# Patient Record
Sex: MALE | Race: WHITE | NOT HISPANIC OR LATINO | Employment: UNEMPLOYED | URBAN - METROPOLITAN AREA
[De-identification: names, ages, dates, MRNs, and addresses within clinical notes are randomized per-mention and may not be internally consistent; named-entity substitution may affect disease eponyms.]

---

## 2017-07-03 ENCOUNTER — HISTORICAL (OUTPATIENT)
Dept: ADMINISTRATIVE | Facility: HOSPITAL | Age: 22
End: 2017-07-03

## 2019-03-08 LAB
CHOLEST SERPL-MCNC: 136 MG/DL
HDLC SERPL-MCNC: 55 MG/DL (ref 35–60)
LDLC SERPL CALC-MCNC: 70 MG/DL
TRIGL SERPL-MCNC: 56 MG/DL (ref 30–150)

## 2019-05-23 ENCOUNTER — HISTORICAL (OUTPATIENT)
Dept: ADMINISTRATIVE | Facility: HOSPITAL | Age: 24
End: 2019-05-23

## 2019-05-23 LAB
ABS NEUT (OLG): 1.78 X10(3)/MCL (ref 2.1–9.2)
BASOPHILS # BLD AUTO: 0 X10(3)/MCL (ref 0–0.2)
BASOPHILS NFR BLD AUTO: 1 %
EOSINOPHIL # BLD AUTO: 0.6 X10(3)/MCL (ref 0–0.9)
EOSINOPHIL NFR BLD AUTO: 12 %
ERYTHROCYTE [DISTWIDTH] IN BLOOD BY AUTOMATED COUNT: 22.7 % (ref 11.5–17)
HCT VFR BLD AUTO: 43.1 % (ref 42–52)
HGB BLD-MCNC: 13.8 GM/DL (ref 14–18)
LYMPHOCYTES # BLD AUTO: 2.4 X10(3)/MCL (ref 0.6–4.6)
LYMPHOCYTES NFR BLD AUTO: 45 %
MCH RBC QN AUTO: 27.6 PG (ref 27–31)
MCHC RBC AUTO-ENTMCNC: 32 GM/DL (ref 33–36)
MCV RBC AUTO: 86.2 FL (ref 80–94)
MONOCYTES # BLD AUTO: 0.4 X10(3)/MCL (ref 0.1–1.3)
MONOCYTES NFR BLD AUTO: 9 %
NEUTROPHILS # BLD AUTO: 1.78 X10(3)/MCL (ref 2.1–9.2)
NEUTROPHILS NFR BLD AUTO: 34 %
PLATELET # BLD AUTO: 158 X10(3)/MCL (ref 130–400)
PMV BLD AUTO: ABNORMAL FL (ref 7.4–10.4)
RBC # BLD AUTO: 5 X10(6)/MCL (ref 4.7–6.1)
WBC # SPEC AUTO: 5.2 X10(3)/MCL (ref 4.5–11.5)

## 2019-07-21 LAB — C DIFF INTERP: NEGATIVE

## 2019-07-22 ENCOUNTER — HISTORICAL (OUTPATIENT)
Dept: ADMINISTRATIVE | Facility: HOSPITAL | Age: 24
End: 2019-07-22

## 2019-07-22 LAB — VALPROATE SERPL-MCNC: 78 MCG/ML (ref 50–100)

## 2019-07-23 ENCOUNTER — HISTORICAL (OUTPATIENT)
Dept: ADMINISTRATIVE | Facility: HOSPITAL | Age: 24
End: 2019-07-23

## 2019-07-23 LAB
ABS NEUT (OLG): 2.05 X10(3)/MCL (ref 2.1–9.2)
BASOPHILS # BLD AUTO: 0 X10(3)/MCL (ref 0–0.2)
BASOPHILS NFR BLD AUTO: 0 %
EOSINOPHIL # BLD AUTO: 0.2 X10(3)/MCL (ref 0–0.9)
EOSINOPHIL NFR BLD AUTO: 5 %
ERYTHROCYTE [DISTWIDTH] IN BLOOD BY AUTOMATED COUNT: 15 % (ref 11.5–17)
HCT VFR BLD AUTO: 40.4 % (ref 42–52)
HGB BLD-MCNC: 13.1 GM/DL (ref 14–18)
LYMPHOCYTES # BLD AUTO: 1.6 X10(3)/MCL (ref 0.6–4.6)
LYMPHOCYTES NFR BLD AUTO: 37 %
MCH RBC QN AUTO: 29.5 PG (ref 27–31)
MCHC RBC AUTO-ENTMCNC: 32.4 GM/DL (ref 33–36)
MCV RBC AUTO: 91 FL (ref 80–94)
MONOCYTES # BLD AUTO: 0.5 X10(3)/MCL (ref 0.1–1.3)
MONOCYTES NFR BLD AUTO: 11 %
NEUTROPHILS # BLD AUTO: 2.05 X10(3)/MCL (ref 2.1–9.2)
NEUTROPHILS NFR BLD AUTO: 46 %
PLATELET # BLD AUTO: 103 X10(3)/MCL (ref 130–400)
PMV BLD AUTO: 10.3 FL (ref 9.4–12.4)
RBC # BLD AUTO: 4.44 X10(6)/MCL (ref 4.7–6.1)
WBC # SPEC AUTO: 4.4 X10(3)/MCL (ref 4.5–11.5)

## 2019-07-26 LAB — FINAL CULTURE: NORMAL

## 2019-08-16 ENCOUNTER — HISTORICAL (OUTPATIENT)
Dept: LAB | Facility: HOSPITAL | Age: 24
End: 2019-08-16

## 2019-08-21 ENCOUNTER — HISTORICAL (OUTPATIENT)
Dept: ADMINISTRATIVE | Facility: HOSPITAL | Age: 24
End: 2019-08-21

## 2019-10-24 ENCOUNTER — HISTORICAL (OUTPATIENT)
Dept: LAB | Facility: HOSPITAL | Age: 24
End: 2019-10-24

## 2019-11-11 ENCOUNTER — HISTORICAL (OUTPATIENT)
Dept: LAB | Facility: HOSPITAL | Age: 24
End: 2019-11-11

## 2019-11-11 LAB — C DIFF INTERP: ABNORMAL

## 2020-01-08 LAB
BUN SERPL-MCNC: 10 MG/DL (ref 7–18)
CALCIUM SERPL-MCNC: 9 MG/DL (ref 8.5–10.1)
CHLORIDE SERPL-SCNC: 100 MMOL/L (ref 98–107)
CO2 SERPL-SCNC: 28 MMOL/L (ref 21–32)
CREAT SERPL-MCNC: 0.71 MG/DL (ref 0.6–1.3)
GLUCOSE SERPL-MCNC: 77 MG/DL (ref 74–106)
POTASSIUM SERPL-SCNC: 4 MMOL/L (ref 3.5–5.1)
SODIUM SERPL-SCNC: 136 MEQ/L (ref 131–145)

## 2020-07-08 ENCOUNTER — HISTORICAL (OUTPATIENT)
Dept: ADMINISTRATIVE | Facility: HOSPITAL | Age: 25
End: 2020-07-08

## 2020-07-08 LAB
ABS NEUT (OLG): 3.23 X10(3)/MCL (ref 2.1–9.2)
ALBUMIN SERPL-MCNC: 2.3 GM/DL (ref 3.5–5)
ALBUMIN/GLOB SERPL: 0.7 RATIO (ref 1.1–2)
ALP SERPL-CCNC: 70 UNIT/L (ref 40–150)
ALT SERPL-CCNC: 28 UNIT/L (ref 0–55)
AST SERPL-CCNC: 37 UNIT/L (ref 5–34)
BASOPHILS # BLD AUTO: 0 X10(3)/MCL (ref 0–0.2)
BASOPHILS NFR BLD AUTO: 0 %
BILIRUB SERPL-MCNC: 0.2 MG/DL
BILIRUBIN DIRECT+TOT PNL SERPL-MCNC: 0.1 MG/DL (ref 0–0.5)
BILIRUBIN DIRECT+TOT PNL SERPL-MCNC: 0.1 MG/DL (ref 0–0.8)
BUN SERPL-MCNC: 11.6 MG/DL (ref 8.9–20.6)
CALCIUM SERPL-MCNC: 8.3 MG/DL (ref 8.4–10.2)
CHLORIDE SERPL-SCNC: 105 MMOL/L (ref 98–107)
CO2 SERPL-SCNC: 28 MMOL/L (ref 22–29)
CREAT SERPL-MCNC: 0.71 MG/DL (ref 0.73–1.18)
EOSINOPHIL # BLD AUTO: 0.4 X10(3)/MCL (ref 0–0.9)
EOSINOPHIL NFR BLD AUTO: 8 %
ERYTHROCYTE [DISTWIDTH] IN BLOOD BY AUTOMATED COUNT: 15.7 % (ref 11.5–17)
GLOBULIN SER-MCNC: 3.1 GM/DL (ref 2.4–3.5)
GLUCOSE SERPL-MCNC: 101 MG/DL (ref 74–100)
HCT VFR BLD AUTO: 39.9 % (ref 42–52)
HGB BLD-MCNC: 13.2 GM/DL (ref 14–18)
IMM GRANULOCYTES # BLD AUTO: 0 10*3/UL
IMM GRANULOCYTES NFR BLD AUTO: 0 %
LYMPHOCYTES # BLD AUTO: 1 X10(3)/MCL (ref 0.6–4.6)
LYMPHOCYTES NFR BLD AUTO: 20 %
MCH RBC QN AUTO: 31.3 PG (ref 27–31)
MCHC RBC AUTO-ENTMCNC: 33.1 GM/DL (ref 33–36)
MCV RBC AUTO: 94.5 FL (ref 80–94)
MONOCYTES # BLD AUTO: 0.5 X10(3)/MCL (ref 0.1–1.3)
MONOCYTES NFR BLD AUTO: 10 %
NEUTROPHILS # BLD AUTO: 3.23 X10(3)/MCL (ref 2.1–9.2)
NEUTROPHILS NFR BLD AUTO: 61 %
PLATELET # BLD AUTO: 89 X10(3)/MCL (ref 130–400)
PMV BLD AUTO: 10.5 FL (ref 9.4–12.4)
POTASSIUM SERPL-SCNC: 3.7 MMOL/L (ref 3.5–5.1)
PROT SERPL-MCNC: 5.4 GM/DL (ref 6.4–8.3)
RBC # BLD AUTO: 4.22 X10(6)/MCL (ref 4.7–6.1)
SODIUM SERPL-SCNC: 141 MMOL/L (ref 136–145)
TSH SERPL-ACNC: 1.98 UIU/ML (ref 0.35–4.94)
VALPROATE SERPL-MCNC: 75.2 UG/ML (ref 50–100)
WBC # SPEC AUTO: 5.3 X10(3)/MCL (ref 4.5–11.5)

## 2020-10-19 ENCOUNTER — HISTORICAL (OUTPATIENT)
Dept: ADMINISTRATIVE | Facility: HOSPITAL | Age: 25
End: 2020-10-19

## 2020-10-19 LAB
ABS NEUT (OLG): 0.93 X10(3)/MCL (ref 2.1–9.2)
ALBUMIN SERPL-MCNC: 2.6 GM/DL (ref 3.5–5)
ALBUMIN/GLOB SERPL: 0.8 RATIO (ref 1.1–2)
ALP SERPL-CCNC: 80 UNIT/L (ref 40–150)
ALT SERPL-CCNC: 41 UNIT/L (ref 0–55)
AST SERPL-CCNC: 55 UNIT/L (ref 5–34)
BASOPHILS NFR BLD MANUAL: 2 % (ref 0–2)
BILIRUB SERPL-MCNC: 0.2 MG/DL
BILIRUBIN DIRECT+TOT PNL SERPL-MCNC: 0.1 MG/DL (ref 0–0.5)
BILIRUBIN DIRECT+TOT PNL SERPL-MCNC: 0.1 MG/DL (ref 0–0.8)
BUN SERPL-MCNC: 13.6 MG/DL (ref 8.9–20.6)
CALCIUM SERPL-MCNC: 8.6 MG/DL (ref 8.4–10.2)
CHLORIDE SERPL-SCNC: 106 MMOL/L (ref 98–107)
CO2 SERPL-SCNC: 30 MMOL/L (ref 22–29)
CREAT SERPL-MCNC: 0.7 MG/DL (ref 0.73–1.18)
EOSINOPHIL NFR BLD MANUAL: 14 % (ref 0–8)
ERYTHROCYTE [DISTWIDTH] IN BLOOD BY AUTOMATED COUNT: 17 % (ref 11.5–17)
GLOBULIN SER-MCNC: 3.1 GM/DL (ref 2.4–3.5)
GLUCOSE SERPL-MCNC: 67 MG/DL (ref 74–100)
HCT VFR BLD AUTO: 41.2 % (ref 42–52)
HGB BLD-MCNC: 13.3 GM/DL (ref 14–18)
LYMPHOCYTES NFR BLD MANUAL: 43 % (ref 13–40)
MCH RBC QN AUTO: 30.9 PG (ref 27–31)
MCHC RBC AUTO-ENTMCNC: 32.3 GM/DL (ref 33–36)
MCV RBC AUTO: 95.6 FL (ref 80–94)
MONOCYTES NFR BLD MANUAL: 6 % (ref 2–11)
NEUTROPHILS NFR BLD MANUAL: 35 % (ref 47–80)
PLATELET # BLD AUTO: 78 X10(3)/MCL (ref 130–400)
PLATELET # BLD EST: ABNORMAL 10*3/UL
PMV BLD AUTO: 11.6 FL (ref 7.4–10.4)
POTASSIUM SERPL-SCNC: 4 MMOL/L (ref 3.5–5.1)
PROT SERPL-MCNC: 5.7 GM/DL (ref 6.4–8.3)
RBC # BLD AUTO: 4.31 X10(6)/MCL (ref 4.7–6.1)
RBC MORPH BLD: NORMAL
SODIUM SERPL-SCNC: 141 MMOL/L (ref 136–145)
WBC # SPEC AUTO: 2.8 X10(3)/MCL (ref 4.5–11.5)

## 2021-04-28 ENCOUNTER — HISTORICAL (OUTPATIENT)
Dept: ADMINISTRATIVE | Facility: HOSPITAL | Age: 26
End: 2021-04-28

## 2021-04-28 LAB
ABS NEUT (OLG): 0.96 X10(3)/MCL (ref 2.1–9.2)
ALBUMIN SERPL-MCNC: 2.7 GM/DL (ref 3.5–5)
ALBUMIN/GLOB SERPL: 0.8 RATIO (ref 1.1–2)
ALP SERPL-CCNC: 75 UNIT/L (ref 40–150)
ALT SERPL-CCNC: 28 UNIT/L (ref 0–55)
ANISOCYTOSIS BLD QL SMEAR: 1
AST SERPL-CCNC: 69 UNIT/L (ref 5–34)
BASOPHILS NFR BLD MANUAL: 1 % (ref 0–2)
BILIRUB SERPL-MCNC: 0.3 MG/DL
BILIRUBIN DIRECT+TOT PNL SERPL-MCNC: 0.1 MG/DL (ref 0–0.5)
BILIRUBIN DIRECT+TOT PNL SERPL-MCNC: 0.2 MG/DL (ref 0–0.8)
BUN SERPL-MCNC: 7.4 MG/DL (ref 8.9–20.6)
CALCIUM SERPL-MCNC: 9 MG/DL (ref 8.4–10.2)
CHLORIDE SERPL-SCNC: 103 MMOL/L (ref 98–107)
CHOLEST SERPL-MCNC: 133 MG/DL
CHOLEST/HDLC SERPL: 3 {RATIO} (ref 0–5)
CO2 SERPL-SCNC: 30 MMOL/L (ref 22–29)
CREAT SERPL-MCNC: 0.7 MG/DL (ref 0.73–1.18)
EOSINOPHIL NFR BLD MANUAL: 14 % (ref 0–8)
ERYTHROCYTE [DISTWIDTH] IN BLOOD BY AUTOMATED COUNT: 16.2 % (ref 11.5–17)
GLOBULIN SER-MCNC: 3.5 GM/DL (ref 2.4–3.5)
GLUCOSE SERPL-MCNC: 75 MG/DL (ref 74–100)
HCT VFR BLD AUTO: 40.3 % (ref 42–52)
HDLC SERPL-MCNC: 49 MG/DL (ref 35–60)
HGB BLD-MCNC: 12.7 GM/DL (ref 14–18)
LDLC SERPL CALC-MCNC: 70 MG/DL (ref 50–140)
LYMPHOCYTES NFR BLD MANUAL: 40 % (ref 13–40)
MCH RBC QN AUTO: 31 PG (ref 27–31)
MCHC RBC AUTO-ENTMCNC: 31.5 GM/DL (ref 33–36)
MCV RBC AUTO: 98.3 FL (ref 80–94)
MONOCYTES NFR BLD MANUAL: 12 % (ref 2–11)
NEUTROPHILS NFR BLD MANUAL: 33 % (ref 47–80)
PLATELET # BLD AUTO: 112 X10(3)/MCL (ref 130–400)
PLATELET # BLD EST: ABNORMAL 10*3/UL
PMV BLD AUTO: 11.3 FL (ref 7.4–10.4)
POLYCHROMASIA BLD QL SMEAR: 1
POTASSIUM SERPL-SCNC: 4 MMOL/L (ref 3.5–5.1)
PROT SERPL-MCNC: 6.2 GM/DL (ref 6.4–8.3)
RBC # BLD AUTO: 4.1 X10(6)/MCL (ref 4.7–6.1)
RBC MORPH BLD: ABNORMAL
SODIUM SERPL-SCNC: 140 MMOL/L (ref 136–145)
TRIGL SERPL-MCNC: 70 MG/DL (ref 34–140)
TSH SERPL-ACNC: 2.91 UIU/ML (ref 0.35–4.94)
VALPROATE SERPL-MCNC: 83.5 UG/ML (ref 50–100)
VLDLC SERPL CALC-MCNC: 14 MG/DL
WBC # SPEC AUTO: 3.3 X10(3)/MCL (ref 4.5–11.5)

## 2021-04-30 LAB — FINAL CULTURE: NORMAL

## 2022-04-09 ENCOUNTER — HISTORICAL (OUTPATIENT)
Dept: ADMINISTRATIVE | Facility: HOSPITAL | Age: 27
End: 2022-04-09
Payer: MEDICAID

## 2022-04-25 DIAGNOSIS — G40.909 SEIZURE DISORDER: ICD-10-CM

## 2022-04-25 DIAGNOSIS — D64.9 ANEMIA, UNSPECIFIED TYPE: Primary | ICD-10-CM

## 2022-04-25 DIAGNOSIS — F84.0 AUTISM: ICD-10-CM

## 2022-04-25 DIAGNOSIS — D69.6 THROMBOCYTOPENIA: ICD-10-CM

## 2022-04-26 DIAGNOSIS — D64.9 ANEMIA, UNSPECIFIED TYPE: Primary | ICD-10-CM

## 2022-04-26 DIAGNOSIS — L98.9 SKIN LESIONS: ICD-10-CM

## 2022-04-26 DIAGNOSIS — Z79.899 ONGOING USE OF POSSIBLY TOXIC MEDICATION: ICD-10-CM

## 2022-04-26 DIAGNOSIS — G40.909 SEIZURE DISORDER: ICD-10-CM

## 2022-04-26 DIAGNOSIS — D69.6 THROMBOCYTOPENIA: ICD-10-CM

## 2022-04-26 DIAGNOSIS — F84.0 AUTISM: ICD-10-CM

## 2022-04-27 VITALS
DIASTOLIC BLOOD PRESSURE: 70 MMHG | HEIGHT: 68 IN | OXYGEN SATURATION: 97 % | BODY MASS INDEX: 17.43 KG/M2 | WEIGHT: 115 LBS | SYSTOLIC BLOOD PRESSURE: 102 MMHG

## 2022-04-28 ENCOUNTER — HISTORICAL (OUTPATIENT)
Dept: ADMINISTRATIVE | Facility: HOSPITAL | Age: 27
End: 2022-04-28
Payer: MEDICAID

## 2022-04-28 LAB
ALBUMIN SERPL-MCNC: 3.3 G/DL (ref 3.5–5)
ALBUMIN/GLOB SERPL: 0.8 {RATIO} (ref 1.1–2)
ALP SERPL-CCNC: 82 U/L (ref 40–150)
ALT SERPL-CCNC: 30 U/L (ref 0–55)
AST SERPL-CCNC: 59 U/L (ref 5–34)
BILIRUB SERPL-MCNC: 0.4 MG/DL
BILIRUBIN DIRECT+TOT PNL SERPL-MCNC: 0.2 (ref 0–0.5)
BILIRUBIN DIRECT+TOT PNL SERPL-MCNC: 0.2 (ref 0–0.8)
BUN SERPL-MCNC: 10.9 MG/DL (ref 8.9–20.6)
CALCIUM SERPL-MCNC: 9.6 MG/DL (ref 8.7–10.5)
CHLORIDE SERPL-SCNC: 103 MMOL/L (ref 98–107)
CHOLEST SERPL-MCNC: 170 MG/DL
CHOLEST/HDLC SERPL: 2 {RATIO} (ref 0–5)
CO2 SERPL-SCNC: 29 MMOL/L (ref 22–29)
CREAT SERPL-MCNC: 0.75 MG/DL (ref 0.73–1.18)
GLOBULIN SER-MCNC: 4.3 G/DL (ref 2.4–3.5)
GLUCOSE SERPL-MCNC: 68 MG/DL (ref 74–100)
HDLC SERPL-MCNC: 69 MG/DL (ref 35–60)
HEMOLYSIS INTERF INDEX SERPL-ACNC: 54
ICTERIC INTERF INDEX SERPL-ACNC: 0
LDLC SERPL CALC-MCNC: 90 MG/DL (ref 50–140)
LIPEMIC INTERF INDEX SERPL-ACNC: 1
POTASSIUM SERPL-SCNC: 4.5 MMOL/L (ref 3.5–5.1)
PROT SERPL-MCNC: 7.6 G/DL (ref 6.4–8.3)
SODIUM SERPL-SCNC: 140 MMOL/L (ref 136–145)
TRIGL SERPL-MCNC: 56 MG/DL (ref 34–140)
TSH SERPL-ACNC: 2.64 M[IU]/L (ref 0.35–4.94)
VLDLC SERPL CALC-MCNC: 11 MG/DL

## 2022-05-02 ENCOUNTER — OFFICE VISIT (OUTPATIENT)
Dept: INTERNAL MEDICINE | Facility: CLINIC | Age: 27
End: 2022-05-02
Payer: MEDICAID

## 2022-05-02 VITALS
HEART RATE: 103 BPM | RESPIRATION RATE: 18 BRPM | DIASTOLIC BLOOD PRESSURE: 82 MMHG | SYSTOLIC BLOOD PRESSURE: 128 MMHG | TEMPERATURE: 99 F | WEIGHT: 120 LBS | HEIGHT: 69 IN | BODY MASS INDEX: 17.77 KG/M2 | OXYGEN SATURATION: 98 %

## 2022-05-02 DIAGNOSIS — G40.909 SEIZURE DISORDER: Primary | ICD-10-CM

## 2022-05-02 PROBLEM — F79 INTELLECTUAL DISABILITY: Status: ACTIVE | Noted: 2022-05-02

## 2022-05-02 PROBLEM — Q99.9 GENETIC DISORDER: Status: ACTIVE | Noted: 2022-05-02

## 2022-05-02 PROBLEM — L98.9 SKIN LESION: Status: ACTIVE | Noted: 2022-05-02

## 2022-05-02 PROCEDURE — 99213 PR OFFICE/OUTPT VISIT, EST, LEVL III, 20-29 MIN: ICD-10-PCS | Mod: ,,, | Performed by: INTERNAL MEDICINE

## 2022-05-02 PROCEDURE — 1160F PR REVIEW ALL MEDS BY PRESCRIBER/CLIN PHARMACIST DOCUMENTED: ICD-10-PCS | Mod: CPTII,,, | Performed by: INTERNAL MEDICINE

## 2022-05-02 PROCEDURE — 1160F RVW MEDS BY RX/DR IN RCRD: CPT | Mod: CPTII,,, | Performed by: INTERNAL MEDICINE

## 2022-05-02 PROCEDURE — 3074F SYST BP LT 130 MM HG: CPT | Mod: CPTII,,, | Performed by: INTERNAL MEDICINE

## 2022-05-02 PROCEDURE — 3008F BODY MASS INDEX DOCD: CPT | Mod: CPTII,,, | Performed by: INTERNAL MEDICINE

## 2022-05-02 PROCEDURE — 99213 OFFICE O/P EST LOW 20 MIN: CPT | Mod: ,,, | Performed by: INTERNAL MEDICINE

## 2022-05-02 PROCEDURE — 3074F PR MOST RECENT SYSTOLIC BLOOD PRESSURE < 130 MM HG: ICD-10-PCS | Mod: CPTII,,, | Performed by: INTERNAL MEDICINE

## 2022-05-02 PROCEDURE — 1159F MED LIST DOCD IN RCRD: CPT | Mod: CPTII,,, | Performed by: INTERNAL MEDICINE

## 2022-05-02 PROCEDURE — 1159F PR MEDICATION LIST DOCUMENTED IN MEDICAL RECORD: ICD-10-PCS | Mod: CPTII,,, | Performed by: INTERNAL MEDICINE

## 2022-05-02 PROCEDURE — 3079F DIAST BP 80-89 MM HG: CPT | Mod: CPTII,,, | Performed by: INTERNAL MEDICINE

## 2022-05-02 PROCEDURE — 3079F PR MOST RECENT DIASTOLIC BLOOD PRESSURE 80-89 MM HG: ICD-10-PCS | Mod: CPTII,,, | Performed by: INTERNAL MEDICINE

## 2022-05-02 PROCEDURE — 3008F PR BODY MASS INDEX (BMI) DOCUMENTED: ICD-10-PCS | Mod: CPTII,,, | Performed by: INTERNAL MEDICINE

## 2022-05-02 RX ORDER — RISPERIDONE 1 MG/1
1 TABLET ORAL 2 TIMES DAILY
COMMUNITY
Start: 2022-04-05 | End: 2022-06-07 | Stop reason: SDUPTHER

## 2022-05-02 RX ORDER — LAMOTRIGINE 100 MG/1
100 TABLET ORAL EVERY MORNING
COMMUNITY
Start: 2022-04-05 | End: 2022-06-07 | Stop reason: SDUPTHER

## 2022-05-02 RX ORDER — LACOSAMIDE 100 MG/1
TABLET, FILM COATED ORAL
COMMUNITY
Start: 2022-04-05 | End: 2022-06-07 | Stop reason: SDUPTHER

## 2022-05-02 RX ORDER — MUPIROCIN 20 MG/G
OINTMENT TOPICAL 3 TIMES DAILY
Qty: 15 G | Refills: 5 | Status: SHIPPED | OUTPATIENT
Start: 2022-05-02

## 2022-05-02 RX ORDER — DIVALPROEX SODIUM 500 MG/1
TABLET, FILM COATED, EXTENDED RELEASE ORAL
COMMUNITY
Start: 2022-04-05 | End: 2022-06-07 | Stop reason: SDUPTHER

## 2022-05-02 RX ORDER — DIVALPROEX SODIUM 250 MG/1
250 TABLET, FILM COATED, EXTENDED RELEASE ORAL DAILY
COMMUNITY
Start: 2022-04-05 | End: 2022-06-07 | Stop reason: SDUPTHER

## 2022-05-02 NOTE — PROGRESS NOTES
Subjective:       Patient ID: Alex Ng is a 27 y.o. male.    Chief Complaint: Follow-up    HPI in for fu mmp.  Mom says he is doing well.  No new problems.  She is asking for rf bactroban ointment for minor skin infections.  Review of Systems   All other systems reviewed and are negative.        Objective:      Physical Exam  Vitals reviewed.   Constitutional:       Appearance: Normal appearance.   HENT:      Head: Normocephalic.   Eyes:      Pupils: Pupils are equal, round, and reactive to light.   Cardiovascular:      Rate and Rhythm: Normal rate and regular rhythm.      Pulses: Normal pulses.      Heart sounds: Normal heart sounds.   Pulmonary:      Effort: Pulmonary effort is normal.      Breath sounds: Normal breath sounds.   Abdominal:      General: Abdomen is flat.      Palpations: Abdomen is soft.   Musculoskeletal:      Cervical back: Neck supple.   Skin:     General: Skin is warm and dry.   Neurological:      Mental Status: He is alert.         Assessment:       Problem List Items Addressed This Visit    None         Plan:     continue same rx, fu with me 6 mos

## 2022-06-07 RX ORDER — DIVALPROEX SODIUM 500 MG/1
TABLET, FILM COATED, EXTENDED RELEASE ORAL
Qty: 270 TABLET | Refills: 3 | Status: SHIPPED | OUTPATIENT
Start: 2022-06-07 | End: 2022-11-30

## 2022-06-07 RX ORDER — LACOSAMIDE 100 MG/1
TABLET, FILM COATED ORAL
Qty: 180 TABLET | Refills: 3 | Status: SHIPPED | OUTPATIENT
Start: 2022-06-07 | End: 2022-11-30

## 2022-06-07 RX ORDER — RISPERIDONE 1 MG/1
1 TABLET ORAL 2 TIMES DAILY
Qty: 30 TABLET | Refills: 11 | Status: SHIPPED | OUTPATIENT
Start: 2022-06-07 | End: 2022-11-30

## 2022-06-07 RX ORDER — LAMOTRIGINE 100 MG/1
100 TABLET ORAL EVERY MORNING
Qty: 90 TABLET | Refills: 3 | Status: SHIPPED | OUTPATIENT
Start: 2022-06-07 | End: 2022-12-29

## 2022-06-07 RX ORDER — DIVALPROEX SODIUM 250 MG/1
250 TABLET, FILM COATED, EXTENDED RELEASE ORAL DAILY
Qty: 30 TABLET | Refills: 11 | Status: SHIPPED | OUTPATIENT
Start: 2022-06-07 | End: 2022-11-30

## 2022-09-16 ENCOUNTER — HISTORICAL (OUTPATIENT)
Dept: ADMINISTRATIVE | Facility: HOSPITAL | Age: 27
End: 2022-09-16
Payer: MEDICAID

## 2022-09-29 DIAGNOSIS — G40.909 SEIZURE DISORDER: Primary | ICD-10-CM

## 2022-09-29 RX ORDER — LAMOTRIGINE 150 MG/1
TABLET ORAL
Qty: 30 TABLET | Refills: 6 | Status: SHIPPED | OUTPATIENT
Start: 2022-09-29 | End: 2023-04-20

## 2022-11-02 ENCOUNTER — OFFICE VISIT (OUTPATIENT)
Dept: INTERNAL MEDICINE | Facility: CLINIC | Age: 27
End: 2022-11-02
Payer: MEDICAID

## 2022-11-02 VITALS
DIASTOLIC BLOOD PRESSURE: 68 MMHG | RESPIRATION RATE: 18 BRPM | HEART RATE: 113 BPM | HEIGHT: 69 IN | WEIGHT: 120 LBS | BODY MASS INDEX: 17.77 KG/M2 | OXYGEN SATURATION: 96 % | SYSTOLIC BLOOD PRESSURE: 124 MMHG

## 2022-11-02 DIAGNOSIS — G40.909 SEIZURE DISORDER: Primary | ICD-10-CM

## 2022-11-02 DIAGNOSIS — Z00.00 WELLNESS EXAMINATION: ICD-10-CM

## 2022-11-02 DIAGNOSIS — F84.0 AUTISM: ICD-10-CM

## 2022-11-02 DIAGNOSIS — D69.6 THROMBOCYTOPENIA: ICD-10-CM

## 2022-11-02 DIAGNOSIS — D64.9 ANEMIA, UNSPECIFIED TYPE: ICD-10-CM

## 2022-11-02 PROBLEM — K22.2 STRICTURE OF ESOPHAGUS: Status: ACTIVE | Noted: 2019-08-13

## 2022-11-02 PROCEDURE — 90471 FLU VACCINE (QUAD) GREATER THAN OR EQUAL TO 3YO PRESERVATIVE FREE IM: ICD-10-PCS | Mod: ,,, | Performed by: INTERNAL MEDICINE

## 2022-11-02 PROCEDURE — 99213 PR OFFICE/OUTPT VISIT, EST, LEVL III, 20-29 MIN: ICD-10-PCS | Mod: 25,,, | Performed by: INTERNAL MEDICINE

## 2022-11-02 PROCEDURE — 99213 OFFICE O/P EST LOW 20 MIN: CPT | Mod: 25,,, | Performed by: INTERNAL MEDICINE

## 2022-11-02 PROCEDURE — 90686 IIV4 VACC NO PRSV 0.5 ML IM: CPT | Mod: ,,, | Performed by: INTERNAL MEDICINE

## 2022-11-02 PROCEDURE — 90686 FLU VACCINE (QUAD) GREATER THAN OR EQUAL TO 3YO PRESERVATIVE FREE IM: ICD-10-PCS | Mod: ,,, | Performed by: INTERNAL MEDICINE

## 2022-11-02 PROCEDURE — 90471 IMMUNIZATION ADMIN: CPT | Mod: ,,, | Performed by: INTERNAL MEDICINE

## 2022-11-02 NOTE — PROGRESS NOTES
"Subjective:       Patient ID: Alex Ng is a 27 y.o. male.    Chief Complaint: Follow-up      The patient is a 27-year-old man in for follow-up of multiple problems.  According to his mother he is doing well.  No recent seizures.  He is eating well.  No problems whatsoever.  He has developed a few subcu nodules at his elbows and behind his ears.  That all seems to bother him at all.    Follow-up      Review of Systems     Current Outpatient Medications on File Prior to Visit   Medication Sig Dispense Refill    divalproex ER (DEPAKOTE ER) 250 MG 24 hr tablet Take 1 tablet (250 mg total) by mouth once daily. 30 tablet 11    divalproex ER (DEPAKOTE) 500 MG Tb24 TAKE 1 TABLET IN THE MORNING AND 2 TABLETS AT BEDTIME 270 tablet 3    lamoTRIgine (LAMICTAL) 100 MG tablet Take 1 tablet (100 mg total) by mouth every morning. 90 tablet 3    lamoTRIgine (LAMICTAL) 150 MG Tab TAKE ONE TABLET BY MOUTH IN THE EVENING. 30 tablet 6    mupirocin (BACTROBAN) 2 % ointment Apply topically 3 (three) times daily. 15 g 5    risperiDONE (RISPERDAL) 1 MG tablet Take 1 tablet (1 mg total) by mouth 2 (two) times daily. 30 tablet 11    VIMPAT 100 mg Tab TAKE ONE TABLET BY MOUTH TWICE DAILY FOR SEIZURES 180 tablet 3     No current facility-administered medications on file prior to visit.     Objective:      /68 (BP Location: Left arm, Patient Position: Sitting, BP Method: Large (Manual))   Pulse (!) 113   Resp 18   Ht 5' 9" (1.753 m)   Wt 54.4 kg (120 lb)   SpO2 96%   BMI 17.72 kg/m²     Physical Exam  Vitals reviewed.   Constitutional:       Appearance: Normal appearance.   HENT:      Head: Normocephalic and atraumatic.      Mouth/Throat:      Pharynx: Oropharynx is clear.   Eyes:      Pupils: Pupils are equal, round, and reactive to light.   Neck:      Comments: There is a 2 cm slightly irregular soft nodular mass just behind the right ear.  Movable and nontender.  Cardiovascular:      Rate and Rhythm: Normal rate and " regular rhythm.      Pulses: Normal pulses.      Heart sounds: Normal heart sounds.   Pulmonary:      Breath sounds: Normal breath sounds.   Abdominal:      General: Abdomen is flat.      Palpations: Abdomen is soft.   Musculoskeletal:      Cervical back: Neck supple.      Comments: 2 cm soft movable mass just above the right elbow on the posterior aspect.  Soft benign feeling movable.   Skin:     General: Skin is warm and dry.   Neurological:      Mental Status: He is alert.       Assessment:       1. Seizure disorder    2. Anemia, unspecified type    3. Autism    4. Thrombocytopenia    5. Wellness examination        Overall he is stable.  He has had no recent seizures.  Overall he is doing quite well  He does have some benign-looking subcutaneous nodules.  Removing these would be relatively difficult for him given the condition.  Likely benign we will continue to monitor them.  Plan:       Continue same meds TLC etc.  Follow-up 6 months with CBC CMP lipid TSH prior

## 2023-03-14 ENCOUNTER — TELEPHONE (OUTPATIENT)
Dept: INTERNAL MEDICINE | Facility: CLINIC | Age: 28
End: 2023-03-14
Payer: MEDICAID

## 2023-03-22 DIAGNOSIS — G40.909 EPILEPSY UNDETERMINED AS TO FOCAL OR GENERALIZED: Primary | ICD-10-CM

## 2023-03-22 RX ORDER — LAMOTRIGINE 100 MG/1
TABLET ORAL
Qty: 90 TABLET | Refills: 3 | Status: SHIPPED | OUTPATIENT
Start: 2023-03-22 | End: 2023-10-10

## 2023-04-25 ENCOUNTER — TELEPHONE (OUTPATIENT)
Dept: INTERNAL MEDICINE | Facility: CLINIC | Age: 28
End: 2023-04-25
Payer: MEDICAID

## 2023-04-25 NOTE — TELEPHONE ENCOUNTER
----- Message from Grecia Rivera MA sent at 4/25/2023 12:07 PM CDT -----  Regarding: mee reed 5/2/23   Fasting labs

## 2023-04-26 ENCOUNTER — LAB VISIT (OUTPATIENT)
Dept: LAB | Facility: HOSPITAL | Age: 28
End: 2023-04-26
Attending: INTERNAL MEDICINE
Payer: MEDICAID

## 2023-04-26 DIAGNOSIS — D69.6 THROMBOCYTOPENIA: ICD-10-CM

## 2023-04-26 DIAGNOSIS — Z00.00 WELLNESS EXAMINATION: ICD-10-CM

## 2023-04-26 DIAGNOSIS — D64.9 ANEMIA, UNSPECIFIED TYPE: ICD-10-CM

## 2023-04-26 DIAGNOSIS — G40.909 SEIZURE DISORDER: ICD-10-CM

## 2023-04-26 DIAGNOSIS — F84.0 AUTISM: ICD-10-CM

## 2023-04-26 LAB
ALBUMIN SERPL-MCNC: 3 G/DL (ref 3.5–5)
ALBUMIN/GLOB SERPL: 0.9 RATIO (ref 1.1–2)
ALP SERPL-CCNC: 70 UNIT/L (ref 40–150)
ALT SERPL-CCNC: 25 UNIT/L (ref 0–55)
AST SERPL-CCNC: 33 UNIT/L (ref 5–34)
BASOPHILS # BLD AUTO: 0.01 X10(3)/MCL (ref 0–0.2)
BASOPHILS NFR BLD AUTO: 0.3 %
BILIRUBIN DIRECT+TOT PNL SERPL-MCNC: 0.4 MG/DL
BUN SERPL-MCNC: 11.3 MG/DL (ref 8.9–20.6)
CALCIUM SERPL-MCNC: 9.1 MG/DL (ref 8.4–10.2)
CHLORIDE SERPL-SCNC: 104 MMOL/L (ref 98–107)
CHOLEST SERPL-MCNC: 151 MG/DL
CHOLEST/HDLC SERPL: 3 {RATIO} (ref 0–5)
CO2 SERPL-SCNC: 25 MMOL/L (ref 22–29)
CREAT SERPL-MCNC: 0.79 MG/DL (ref 0.73–1.18)
EOSINOPHIL # BLD AUTO: 0.25 X10(3)/MCL (ref 0–0.9)
EOSINOPHIL NFR BLD AUTO: 7.5 %
ERYTHROCYTE [DISTWIDTH] IN BLOOD BY AUTOMATED COUNT: 16.1 % (ref 11.5–17)
GFR SERPLBLD CREATININE-BSD FMLA CKD-EPI: >60 MLS/MIN/1.73/M2
GLOBULIN SER-MCNC: 3.3 GM/DL (ref 2.4–3.5)
GLUCOSE SERPL-MCNC: 77 MG/DL (ref 74–100)
HCT VFR BLD AUTO: 40.6 % (ref 42–52)
HDLC SERPL-MCNC: 59 MG/DL (ref 35–60)
HGB BLD-MCNC: 12.9 G/DL (ref 14–18)
IMM GRANULOCYTES # BLD AUTO: 0.01 X10(3)/MCL (ref 0–0.04)
IMM GRANULOCYTES NFR BLD AUTO: 0.3 %
LDLC SERPL CALC-MCNC: 81 MG/DL (ref 50–140)
LYMPHOCYTES # BLD AUTO: 1.71 X10(3)/MCL (ref 0.6–4.6)
LYMPHOCYTES NFR BLD AUTO: 51 %
MCH RBC QN AUTO: 29 PG (ref 27–31)
MCHC RBC AUTO-ENTMCNC: 31.8 G/DL (ref 33–36)
MCV RBC AUTO: 91.2 FL (ref 80–94)
MONOCYTES # BLD AUTO: 0.3 X10(3)/MCL (ref 0.1–1.3)
MONOCYTES NFR BLD AUTO: 9 %
NEUTROPHILS # BLD AUTO: 1.07 X10(3)/MCL (ref 2.1–9.2)
NEUTROPHILS NFR BLD AUTO: 31.9 %
NRBC BLD AUTO-RTO: 0 %
PLATELET # BLD AUTO: 119 X10(3)/MCL (ref 130–400)
PMV BLD AUTO: 11.1 FL (ref 7.4–10.4)
POTASSIUM SERPL-SCNC: 4.1 MMOL/L (ref 3.5–5.1)
PROT SERPL-MCNC: 6.3 GM/DL (ref 6.4–8.3)
RBC # BLD AUTO: 4.45 X10(6)/MCL (ref 4.7–6.1)
SODIUM SERPL-SCNC: 139 MMOL/L (ref 136–145)
TRIGL SERPL-MCNC: 57 MG/DL (ref 34–140)
TSH SERPL-ACNC: 2.82 UIU/ML (ref 0.35–4.94)
VLDLC SERPL CALC-MCNC: 11 MG/DL
WBC # SPEC AUTO: 3.4 X10(3)/MCL (ref 4.5–11.5)

## 2023-04-26 PROCEDURE — 84443 ASSAY THYROID STIM HORMONE: CPT

## 2023-04-26 PROCEDURE — 85025 COMPLETE CBC W/AUTO DIFF WBC: CPT

## 2023-04-26 PROCEDURE — 80061 LIPID PANEL: CPT

## 2023-04-26 PROCEDURE — 80053 COMPREHEN METABOLIC PANEL: CPT

## 2023-04-26 PROCEDURE — 36415 COLL VENOUS BLD VENIPUNCTURE: CPT

## 2023-05-02 ENCOUNTER — OFFICE VISIT (OUTPATIENT)
Dept: INTERNAL MEDICINE | Facility: CLINIC | Age: 28
End: 2023-05-02
Payer: MEDICAID

## 2023-05-02 VITALS
HEIGHT: 69 IN | OXYGEN SATURATION: 98 % | SYSTOLIC BLOOD PRESSURE: 116 MMHG | BODY MASS INDEX: 17.48 KG/M2 | WEIGHT: 118 LBS | DIASTOLIC BLOOD PRESSURE: 68 MMHG

## 2023-05-02 DIAGNOSIS — D64.9 ANEMIA, UNSPECIFIED TYPE: ICD-10-CM

## 2023-05-02 DIAGNOSIS — Z00.00 WELLNESS EXAMINATION: Primary | ICD-10-CM

## 2023-05-02 DIAGNOSIS — B37.2 YEAST DERMATITIS: ICD-10-CM

## 2023-05-02 DIAGNOSIS — F84.0 AUTISM: ICD-10-CM

## 2023-05-02 DIAGNOSIS — G40.909 SEIZURE DISORDER: ICD-10-CM

## 2023-05-02 DIAGNOSIS — D69.6 THROMBOCYTOPENIA: ICD-10-CM

## 2023-05-02 PROCEDURE — 99395 PREV VISIT EST AGE 18-39: CPT | Mod: ,,, | Performed by: INTERNAL MEDICINE

## 2023-05-02 PROCEDURE — 99395 PR PREVENTIVE VISIT,EST,18-39: ICD-10-PCS | Mod: ,,, | Performed by: INTERNAL MEDICINE

## 2023-05-02 RX ORDER — BROMPHENIRAMINE MALEATE, DEXTROMETHORPHAN HBR, PHENYLEPHRINE HCL, DIPHENHYDRAMINE HCL, PHENYLEPHRINE HCL 0.52G
0.52 KIT ORAL EVERY OTHER DAY
COMMUNITY

## 2023-05-02 RX ORDER — NYSTATIN 100000 [USP'U]/G
POWDER TOPICAL 2 TIMES DAILY
Qty: 30 G | Refills: 11 | Status: SHIPPED | OUTPATIENT
Start: 2023-05-02

## 2023-05-02 RX ORDER — ESOMEPRAZOLE MAGNESIUM 40 MG/1
40 CAPSULE, DELAYED RELEASE ORAL DAILY
COMMUNITY
Start: 2023-03-05 | End: 2024-01-16

## 2023-05-02 RX ORDER — PYRIDOXINE HCL (VITAMIN B6) 100 MG
100 TABLET ORAL DAILY
COMMUNITY

## 2023-05-02 RX ORDER — LACTOBACILLUS ACIDOPHILUS 2B CELL
1 TABLET ORAL DAILY
COMMUNITY

## 2023-05-02 NOTE — PROGRESS NOTES
"Subjective:       Patient ID: Alex Ng is a 28 y.o. male.    Chief Complaint: Annual Exam (Labs 04/26/23 Rash on groin area, has been having for a couple weeks. )      The patient is a 28-year-old man in for follow-up multiple medical problems/wellness.  No new complaints except for groin rash.  His mom thinks it might be a yeast infection.  He does wear diapers.  He does eat well.  No recent seizures.    Review of Systems     Current Outpatient Medications on File Prior to Visit   Medication Sig Dispense Refill    divalproex 500 MG Tb24 TAKE 1 TABLET IN THE MORNING AND 2 TABLETS AT BEDTIME 90 tablet 3    divalproex ER (DEPAKOTE ER) 250 MG 24 hr tablet TAKE ONE TABLET BY MOUTH EVERY DAY 30 tablet 11    esomeprazole (NEXIUM) 40 MG capsule Take 40 mg by mouth once daily.      Lactobacillus acidophilus 2 billion cell Tab Take 1 capsule by mouth once daily.      lamoTRIgine (LAMICTAL) 100 MG tablet TAKE ONE TABLET BY MOUTH EVERY MORNING 90 tablet 3    lamoTRIgine (LAMICTAL) 150 MG Tab TAKE ONE TABLET BY MOUTH IN THE EVENING. 30 tablet 6    mupirocin (BACTROBAN) 2 % ointment Apply topically 3 (three) times daily. 15 g 5    psyllium 0.52 gram capsule Take 0.52 g by mouth every other day.      pyridoxine, vitamin B6, (B-6) 100 MG Tab Take 100 mg by mouth once daily.      risperiDONE (RISPERDAL) 1 MG tablet TAKE ONE TABLET BY MOUTH TWICE DAILY 60 tablet 11    VIMPAT 100 mg Tab TAKE ONE TABLET BY MOUTH TWICE DAILY FOR SEIZURES 60 tablet 3     No current facility-administered medications on file prior to visit.     Objective:      /68   Ht 5' 9" (1.753 m)   Wt 53.5 kg (118 lb)   SpO2 98%   BMI 17.43 kg/m²     Physical Exam  Vitals reviewed.   Constitutional:       Appearance: Normal appearance.   HENT:      Head: Normocephalic and atraumatic.      Mouth/Throat:      Pharynx: Oropharynx is clear.   Eyes:      Pupils: Pupils are equal, round, and reactive to light.   Cardiovascular:      Rate and Rhythm: " Normal rate and regular rhythm.      Pulses: Normal pulses.      Heart sounds: Normal heart sounds.   Pulmonary:      Breath sounds: Normal breath sounds.   Abdominal:      General: Abdomen is flat.      Palpations: Abdomen is soft.   Genitourinary:     Comments: In the intertriginous areas of the groin there is a fiery red rash that is fairly diffuse with numerous small red satellite lesions.  Musculoskeletal:      Cervical back: Neck supple.   Skin:     General: Skin is warm and dry.   Neurological:      Mental Status: He is alert.       Assessment:       1. Wellness    2. Autism.  Severe.      3. Seizure disorder.  Control    4. Pancytopenia.  Better than previous    5. Yeast dermatitis.  Plan:     Continue same meds TLC etc..  Add nystatin powder topically b.i.d. times 2-3 weeks and it can be used daily as a preventative.  Keep area clean and dry as possible.    Follow-up with me 6 months with CBC CMP prior.

## 2023-06-20 DIAGNOSIS — G40.909 SEIZURE DISORDER: ICD-10-CM

## 2023-06-20 DIAGNOSIS — F84.0 AUTISTIC DISORDER: ICD-10-CM

## 2023-06-20 RX ORDER — LACOSAMIDE 100 MG/1
TABLET, FILM COATED ORAL
Qty: 60 TABLET | Refills: 3 | Status: SHIPPED | OUTPATIENT
Start: 2023-06-20 | End: 2023-10-10

## 2023-06-20 RX ORDER — DIVALPROEX SODIUM 500 MG/1
TABLET, FILM COATED, EXTENDED RELEASE ORAL
Qty: 90 TABLET | Refills: 3 | Status: SHIPPED | OUTPATIENT
Start: 2023-06-20 | End: 2023-10-10

## 2023-07-18 DIAGNOSIS — G40.909 SEIZURE DISORDER: ICD-10-CM

## 2023-07-18 DIAGNOSIS — F84.0 AUTISTIC DISORDER: ICD-10-CM

## 2023-07-18 RX ORDER — RISPERIDONE 1 MG/1
TABLET ORAL
Qty: 60 TABLET | Refills: 11 | Status: SHIPPED | OUTPATIENT
Start: 2023-07-18 | End: 2024-01-17

## 2023-07-18 RX ORDER — DIVALPROEX SODIUM 250 MG/1
TABLET, FILM COATED, EXTENDED RELEASE ORAL
Qty: 30 TABLET | Refills: 11 | Status: SHIPPED | OUTPATIENT
Start: 2023-07-18 | End: 2024-01-17

## 2023-10-10 DIAGNOSIS — F84.0 AUTISTIC DISORDER: ICD-10-CM

## 2023-10-10 DIAGNOSIS — G40.909 EPILEPSY UNDETERMINED AS TO FOCAL OR GENERALIZED: ICD-10-CM

## 2023-10-10 DIAGNOSIS — G40.909 SEIZURE DISORDER: ICD-10-CM

## 2023-10-10 RX ORDER — DIVALPROEX SODIUM 500 MG/1
TABLET, FILM COATED, EXTENDED RELEASE ORAL
Qty: 90 TABLET | Refills: 3 | Status: SHIPPED | OUTPATIENT
Start: 2023-10-10 | End: 2024-02-12

## 2023-10-10 RX ORDER — LACOSAMIDE 100 MG/1
TABLET ORAL
Qty: 60 TABLET | Refills: 3 | Status: SHIPPED | OUTPATIENT
Start: 2023-10-10 | End: 2024-02-12

## 2023-10-10 RX ORDER — LAMOTRIGINE 150 MG/1
TABLET ORAL
Qty: 30 TABLET | Refills: 6 | Status: SHIPPED | OUTPATIENT
Start: 2023-10-10

## 2023-10-10 RX ORDER — LAMOTRIGINE 100 MG/1
TABLET ORAL
Qty: 30 TABLET | Refills: 3 | Status: SHIPPED | OUTPATIENT
Start: 2023-10-10 | End: 2024-01-17

## 2023-11-01 ENCOUNTER — TELEPHONE (OUTPATIENT)
Dept: INTERNAL MEDICINE | Facility: CLINIC | Age: 28
End: 2023-11-01
Payer: MEDICAID

## 2023-11-01 NOTE — TELEPHONE ENCOUNTER
----- Message from Nhi Whitman LPN sent at 11/1/2023  7:40 AM CDT -----  Regarding: mee Israel 11/8 @1:20  Fasting labs needed.

## 2023-11-06 ENCOUNTER — LAB VISIT (OUTPATIENT)
Dept: LAB | Facility: HOSPITAL | Age: 28
End: 2023-11-06
Attending: INTERNAL MEDICINE
Payer: MEDICAID

## 2023-11-06 DIAGNOSIS — G40.909 SEIZURE DISORDER: ICD-10-CM

## 2023-11-06 DIAGNOSIS — F84.0 AUTISM: ICD-10-CM

## 2023-11-06 DIAGNOSIS — D69.6 THROMBOCYTOPENIA: ICD-10-CM

## 2023-11-06 DIAGNOSIS — D64.9 ANEMIA, UNSPECIFIED TYPE: ICD-10-CM

## 2023-11-06 LAB
ALBUMIN SERPL-MCNC: 3.1 G/DL (ref 3.5–5)
ALBUMIN/GLOB SERPL: 0.9 RATIO (ref 1.1–2)
ALP SERPL-CCNC: 66 UNIT/L (ref 40–150)
ALT SERPL-CCNC: 22 UNIT/L (ref 0–55)
AST SERPL-CCNC: 31 UNIT/L (ref 5–34)
BASOPHILS # BLD AUTO: 0.02 X10(3)/MCL
BASOPHILS NFR BLD AUTO: 0.5 %
BILIRUB SERPL-MCNC: 0.3 MG/DL
BUN SERPL-MCNC: 12.7 MG/DL (ref 8.9–20.6)
CALCIUM SERPL-MCNC: 9.5 MG/DL (ref 8.4–10.2)
CHLORIDE SERPL-SCNC: 107 MMOL/L (ref 98–107)
CO2 SERPL-SCNC: 27 MMOL/L (ref 22–29)
CREAT SERPL-MCNC: 0.78 MG/DL (ref 0.73–1.18)
EOSINOPHIL # BLD AUTO: 0.29 X10(3)/MCL (ref 0–0.9)
EOSINOPHIL NFR BLD AUTO: 6.8 %
ERYTHROCYTE [DISTWIDTH] IN BLOOD BY AUTOMATED COUNT: 16.7 % (ref 11.5–17)
GFR SERPLBLD CREATININE-BSD FMLA CKD-EPI: >60 MLS/MIN/1.73/M2
GLOBULIN SER-MCNC: 3.6 GM/DL (ref 2.4–3.5)
GLUCOSE SERPL-MCNC: 70 MG/DL (ref 74–100)
HCT VFR BLD AUTO: 43.1 % (ref 42–52)
HGB BLD-MCNC: 13.6 G/DL (ref 14–18)
IMM GRANULOCYTES # BLD AUTO: 0.01 X10(3)/MCL (ref 0–0.04)
IMM GRANULOCYTES NFR BLD AUTO: 0.2 %
LYMPHOCYTES # BLD AUTO: 1.93 X10(3)/MCL (ref 0.6–4.6)
LYMPHOCYTES NFR BLD AUTO: 45 %
MCH RBC QN AUTO: 27.6 PG (ref 27–31)
MCHC RBC AUTO-ENTMCNC: 31.6 G/DL (ref 33–36)
MCV RBC AUTO: 87.4 FL (ref 80–94)
MONOCYTES # BLD AUTO: 0.34 X10(3)/MCL (ref 0.1–1.3)
MONOCYTES NFR BLD AUTO: 7.9 %
NEUTROPHILS # BLD AUTO: 1.7 X10(3)/MCL (ref 2.1–9.2)
NEUTROPHILS NFR BLD AUTO: 39.6 %
NRBC BLD AUTO-RTO: 0 %
PLATELET # BLD AUTO: 120 X10(3)/MCL (ref 130–400)
PMV BLD AUTO: 11.1 FL (ref 7.4–10.4)
POTASSIUM SERPL-SCNC: 4.2 MMOL/L (ref 3.5–5.1)
PROT SERPL-MCNC: 6.7 GM/DL (ref 6.4–8.3)
RBC # BLD AUTO: 4.93 X10(6)/MCL (ref 4.7–6.1)
SODIUM SERPL-SCNC: 142 MMOL/L (ref 136–145)
WBC # SPEC AUTO: 4.29 X10(3)/MCL (ref 4.5–11.5)

## 2023-11-06 PROCEDURE — 85025 COMPLETE CBC W/AUTO DIFF WBC: CPT

## 2023-11-06 PROCEDURE — 36415 COLL VENOUS BLD VENIPUNCTURE: CPT

## 2023-11-06 PROCEDURE — 80053 COMPREHEN METABOLIC PANEL: CPT

## 2023-11-08 ENCOUNTER — OFFICE VISIT (OUTPATIENT)
Dept: INTERNAL MEDICINE | Facility: CLINIC | Age: 28
End: 2023-11-08
Payer: MEDICAID

## 2023-11-08 VITALS
BODY MASS INDEX: 17.77 KG/M2 | SYSTOLIC BLOOD PRESSURE: 117 MMHG | DIASTOLIC BLOOD PRESSURE: 79 MMHG | OXYGEN SATURATION: 96 % | WEIGHT: 120 LBS | RESPIRATION RATE: 18 BRPM | HEART RATE: 112 BPM | HEIGHT: 69 IN

## 2023-11-08 DIAGNOSIS — G40.909 SEIZURE DISORDER: Primary | ICD-10-CM

## 2023-11-08 DIAGNOSIS — Z00.00 WELLNESS EXAMINATION: ICD-10-CM

## 2023-11-08 DIAGNOSIS — D64.9 ANEMIA, UNSPECIFIED TYPE: ICD-10-CM

## 2023-11-08 DIAGNOSIS — D69.6 THROMBOCYTOPENIA: ICD-10-CM

## 2023-11-08 PROCEDURE — 3008F PR BODY MASS INDEX (BMI) DOCUMENTED: ICD-10-PCS | Mod: CPTII,,, | Performed by: INTERNAL MEDICINE

## 2023-11-08 PROCEDURE — 90686 FLU VACCINE (QUAD) GREATER THAN OR EQUAL TO 3YO PRESERVATIVE FREE IM: ICD-10-PCS | Mod: ,,, | Performed by: INTERNAL MEDICINE

## 2023-11-08 PROCEDURE — 99213 PR OFFICE/OUTPT VISIT, EST, LEVL III, 20-29 MIN: ICD-10-PCS | Mod: 25,,, | Performed by: INTERNAL MEDICINE

## 2023-11-08 PROCEDURE — 1159F PR MEDICATION LIST DOCUMENTED IN MEDICAL RECORD: ICD-10-PCS | Mod: CPTII,,, | Performed by: INTERNAL MEDICINE

## 2023-11-08 PROCEDURE — 90471 IMMUNIZATION ADMIN: CPT | Mod: ,,, | Performed by: INTERNAL MEDICINE

## 2023-11-08 PROCEDURE — 90471 FLU VACCINE (QUAD) GREATER THAN OR EQUAL TO 3YO PRESERVATIVE FREE IM: ICD-10-PCS | Mod: ,,, | Performed by: INTERNAL MEDICINE

## 2023-11-08 PROCEDURE — 3074F SYST BP LT 130 MM HG: CPT | Mod: CPTII,,, | Performed by: INTERNAL MEDICINE

## 2023-11-08 PROCEDURE — 3078F PR MOST RECENT DIASTOLIC BLOOD PRESSURE < 80 MM HG: ICD-10-PCS | Mod: CPTII,,, | Performed by: INTERNAL MEDICINE

## 2023-11-08 PROCEDURE — 90686 IIV4 VACC NO PRSV 0.5 ML IM: CPT | Mod: ,,, | Performed by: INTERNAL MEDICINE

## 2023-11-08 PROCEDURE — 3074F PR MOST RECENT SYSTOLIC BLOOD PRESSURE < 130 MM HG: ICD-10-PCS | Mod: CPTII,,, | Performed by: INTERNAL MEDICINE

## 2023-11-08 PROCEDURE — 1159F MED LIST DOCD IN RCRD: CPT | Mod: CPTII,,, | Performed by: INTERNAL MEDICINE

## 2023-11-08 PROCEDURE — 3008F BODY MASS INDEX DOCD: CPT | Mod: CPTII,,, | Performed by: INTERNAL MEDICINE

## 2023-11-08 PROCEDURE — 99213 OFFICE O/P EST LOW 20 MIN: CPT | Mod: 25,,, | Performed by: INTERNAL MEDICINE

## 2023-11-08 PROCEDURE — 3078F DIAST BP <80 MM HG: CPT | Mod: CPTII,,, | Performed by: INTERNAL MEDICINE

## 2023-11-08 NOTE — PROGRESS NOTES
"Subjective:       Patient ID: Alex Ng is a 28 y.o. male.    Chief Complaint: Follow-up      The patient is a 28-year-old man in for follow-up of multiple medical problems.  He was brought in by his mother and a sitter.  He is had no problems since I saw him last.  He is eating well and gained a bit of weight.    Follow-up      Review of Systems     Current Outpatient Medications on File Prior to Visit   Medication Sig Dispense Refill    divalproex 500 MG Tb24 TAKE 1 TABLET IN THE MORNING AND 2 TABLETS AT BEDTIME 90 tablet 3    divalproex ER (DEPAKOTE ER) 250 MG 24 hr tablet TAKE ONE TABLET BY MOUTH EVERY DAY 30 tablet 11    lacosamide (VIMPAT) 100 mg Tab TAKE ONE TABLET BY MOUTH TWICE DAILY FOR SEIZURES 60 tablet 3    Lactobacillus acidophilus 2 billion cell Tab Take 1 capsule by mouth once daily.      lamoTRIgine (LAMICTAL) 100 MG tablet TAKE ONE TABLET BY MOUTH EVERY MORNING 30 tablet 3    lamoTRIgine (LAMICTAL) 150 MG Tab TAKE ONE TABLET BY MOUTH IN THE EVENING. 30 tablet 6    mupirocin (BACTROBAN) 2 % ointment Apply topically 3 (three) times daily. 15 g 5    nystatin (MYCOSTATIN) powder Apply topically 2 (two) times daily. 30 g 11    psyllium 0.52 gram capsule Take 0.52 g by mouth every other day.      pyridoxine, vitamin B6, (B-6) 100 MG Tab Take 100 mg by mouth once daily.      risperiDONE (RISPERDAL) 1 MG tablet TAKE ONE TABLET BY MOUTH TWICE DAILY 60 tablet 11    esomeprazole (NEXIUM) 40 MG capsule Take 40 mg by mouth once daily.       No current facility-administered medications on file prior to visit.     Objective:      /79 (BP Location: Right arm, Patient Position: Sitting, BP Method: Large (Manual))   Pulse (!) 112   Resp 18   Ht 5' 9" (1.753 m)   Wt 54.4 kg (120 lb)   SpO2 96%   BMI 17.72 kg/m²     Physical Exam  Vitals reviewed.   Constitutional:       Appearance: Normal appearance.      Comments: He is aphasic.  He does not follow commands consistently.  He is in no distress " however.   HENT:      Head: Normocephalic and atraumatic.      Mouth/Throat:      Pharynx: Oropharynx is clear.   Eyes:      Pupils: Pupils are equal, round, and reactive to light.   Cardiovascular:      Rate and Rhythm: Normal rate and regular rhythm.      Pulses: Normal pulses.      Heart sounds: Normal heart sounds.   Pulmonary:      Breath sounds: Normal breath sounds.   Abdominal:      General: Abdomen is flat.      Palpations: Abdomen is soft.   Musculoskeletal:      Cervical back: Neck supple.   Skin:     General: Skin is warm and dry.   Neurological:      Mental Status: He is alert.       Lab work reviewed  Assessment:       1. Seizure disorder.  Stable no recurrence     2. Autism.  No change    3. Abnormal CBC.  Slight anemia and mild thrombocytopenia.  All stable.  Maybe related to medications but acceptable values  Plan:     Same meds TLC etc. and follow-up 6 months for annual checkup with CBC CMP lipid TSH prior

## 2024-01-15 DIAGNOSIS — K21.9 GASTROESOPHAGEAL REFLUX DISEASE, UNSPECIFIED WHETHER ESOPHAGITIS PRESENT: Primary | ICD-10-CM

## 2024-01-16 RX ORDER — ESOMEPRAZOLE MAGNESIUM 40 MG/1
CAPSULE, DELAYED RELEASE ORAL
Qty: 30 CAPSULE | Refills: 5 | Status: SHIPPED | OUTPATIENT
Start: 2024-01-16

## 2024-01-17 DIAGNOSIS — F84.0 AUTISTIC DISORDER: ICD-10-CM

## 2024-01-17 DIAGNOSIS — G40.909 SEIZURE DISORDER: ICD-10-CM

## 2024-01-17 DIAGNOSIS — G40.909 EPILEPSY UNDETERMINED AS TO FOCAL OR GENERALIZED: ICD-10-CM

## 2024-01-17 RX ORDER — RISPERIDONE 1 MG/1
TABLET ORAL
Qty: 60 TABLET | Refills: 11 | Status: SHIPPED | OUTPATIENT
Start: 2024-01-17

## 2024-01-17 RX ORDER — DIVALPROEX SODIUM 250 MG/1
TABLET, FILM COATED, EXTENDED RELEASE ORAL
Qty: 30 TABLET | Refills: 11 | Status: SHIPPED | OUTPATIENT
Start: 2024-01-17

## 2024-01-17 RX ORDER — LAMOTRIGINE 100 MG/1
TABLET ORAL
Qty: 30 TABLET | Refills: 3 | Status: SHIPPED | OUTPATIENT
Start: 2024-01-17 | End: 2024-05-14

## 2024-02-12 DIAGNOSIS — F84.0 AUTISTIC DISORDER: ICD-10-CM

## 2024-02-12 DIAGNOSIS — G40.909 SEIZURE DISORDER: ICD-10-CM

## 2024-02-12 RX ORDER — LACOSAMIDE 100 MG/1
TABLET ORAL
Qty: 60 TABLET | Refills: 3 | Status: SHIPPED | OUTPATIENT
Start: 2024-02-12 | End: 2024-06-11

## 2024-02-12 RX ORDER — DIVALPROEX SODIUM 500 MG/1
TABLET, FILM COATED, EXTENDED RELEASE ORAL
Qty: 90 TABLET | Refills: 3 | Status: SHIPPED | OUTPATIENT
Start: 2024-02-12 | End: 2024-06-11

## 2024-04-12 DIAGNOSIS — G40.909 SEIZURE DISORDER: ICD-10-CM

## 2024-04-15 RX ORDER — LAMOTRIGINE 150 MG/1
TABLET ORAL
Qty: 30 TABLET | Refills: 6 | Status: SHIPPED | OUTPATIENT
Start: 2024-04-15

## 2024-05-08 ENCOUNTER — TELEPHONE (OUTPATIENT)
Dept: INTERNAL MEDICINE | Facility: CLINIC | Age: 29
End: 2024-05-08
Payer: MEDICAID

## 2024-05-08 NOTE — TELEPHONE ENCOUNTER
----- Message from Gianluca Beard LPN sent at 5/8/2024  8:34 AM CDT -----  Regarding: mee reed 5/16 @1  Are there any outstanding tasks in patient chart? Needs fasting labs    Is there documentation of outstanding tasks in patient chart? no    Has patient been to the ER, urgent care, or another physician since last visit?    Has patient done any blood work or x-rays since last visit?    5. PLEASE HAVE PATIENT BRING MEDICATION LIST OR BOTTLES TO EVERY OFFICE VISIT

## 2024-05-13 ENCOUNTER — LAB VISIT (OUTPATIENT)
Dept: LAB | Facility: HOSPITAL | Age: 29
End: 2024-05-13
Attending: INTERNAL MEDICINE
Payer: MEDICAID

## 2024-05-13 DIAGNOSIS — D69.6 THROMBOCYTOPENIA: ICD-10-CM

## 2024-05-13 DIAGNOSIS — D64.9 ANEMIA, UNSPECIFIED TYPE: ICD-10-CM

## 2024-05-13 DIAGNOSIS — G40.909 SEIZURE DISORDER: ICD-10-CM

## 2024-05-13 DIAGNOSIS — Z00.00 WELLNESS EXAMINATION: ICD-10-CM

## 2024-05-13 LAB
ALBUMIN SERPL-MCNC: 3 G/DL (ref 3.5–5)
ALBUMIN/GLOB SERPL: 0.8 RATIO (ref 1.1–2)
ALP SERPL-CCNC: 60 UNIT/L (ref 40–150)
ALT SERPL-CCNC: 18 UNIT/L (ref 0–55)
AST SERPL-CCNC: 26 UNIT/L (ref 5–34)
BASOPHILS # BLD AUTO: 0.02 X10(3)/MCL
BASOPHILS NFR BLD AUTO: 0.5 %
BILIRUB SERPL-MCNC: 0.3 MG/DL
BUN SERPL-MCNC: 12.1 MG/DL (ref 8.9–20.6)
CALCIUM SERPL-MCNC: 9 MG/DL (ref 8.4–10.2)
CHLORIDE SERPL-SCNC: 103 MMOL/L (ref 98–107)
CHOLEST SERPL-MCNC: 143 MG/DL
CHOLEST/HDLC SERPL: 2 {RATIO} (ref 0–5)
CO2 SERPL-SCNC: 28 MMOL/L (ref 22–29)
CREAT SERPL-MCNC: 0.75 MG/DL (ref 0.73–1.18)
EOSINOPHIL # BLD AUTO: 0.19 X10(3)/MCL (ref 0–0.9)
EOSINOPHIL NFR BLD AUTO: 5 %
ERYTHROCYTE [DISTWIDTH] IN BLOOD BY AUTOMATED COUNT: 16.8 % (ref 11.5–17)
GFR SERPLBLD CREATININE-BSD FMLA CKD-EPI: >60 ML/MIN/1.73/M2
GLOBULIN SER-MCNC: 3.6 GM/DL (ref 2.4–3.5)
GLUCOSE SERPL-MCNC: 82 MG/DL (ref 74–100)
HCT VFR BLD AUTO: 38.8 % (ref 42–52)
HDLC SERPL-MCNC: 65 MG/DL (ref 35–60)
HGB BLD-MCNC: 13 G/DL (ref 14–18)
IMM GRANULOCYTES # BLD AUTO: 0.02 X10(3)/MCL (ref 0–0.04)
IMM GRANULOCYTES NFR BLD AUTO: 0.5 %
LDLC SERPL CALC-MCNC: 69 MG/DL (ref 50–140)
LYMPHOCYTES # BLD AUTO: 2.08 X10(3)/MCL (ref 0.6–4.6)
LYMPHOCYTES NFR BLD AUTO: 54.6 %
MCH RBC QN AUTO: 28.8 PG (ref 27–31)
MCHC RBC AUTO-ENTMCNC: 33.5 G/DL (ref 33–36)
MCV RBC AUTO: 86 FL (ref 80–94)
MONOCYTES # BLD AUTO: 0.33 X10(3)/MCL (ref 0.1–1.3)
MONOCYTES NFR BLD AUTO: 8.7 %
NEUTROPHILS # BLD AUTO: 1.17 X10(3)/MCL (ref 2.1–9.2)
NEUTROPHILS NFR BLD AUTO: 30.7 %
NRBC BLD AUTO-RTO: 0 %
PLATELET # BLD AUTO: 104 X10(3)/MCL (ref 130–400)
PLATELETS.RETICULATED NFR BLD AUTO: 4.5 % (ref 0.9–11.2)
PMV BLD AUTO: 10.2 FL (ref 7.4–10.4)
POTASSIUM SERPL-SCNC: 4 MMOL/L (ref 3.5–5.1)
PROT SERPL-MCNC: 6.6 GM/DL (ref 6.4–8.3)
RBC # BLD AUTO: 4.51 X10(6)/MCL (ref 4.7–6.1)
SODIUM SERPL-SCNC: 140 MMOL/L (ref 136–145)
TRIGL SERPL-MCNC: 47 MG/DL (ref 34–140)
TSH SERPL-ACNC: 3.23 UIU/ML (ref 0.35–4.94)
VLDLC SERPL CALC-MCNC: 9 MG/DL
WBC # SPEC AUTO: 3.81 X10(3)/MCL (ref 4.5–11.5)

## 2024-05-13 PROCEDURE — 36415 COLL VENOUS BLD VENIPUNCTURE: CPT

## 2024-05-13 PROCEDURE — 80053 COMPREHEN METABOLIC PANEL: CPT

## 2024-05-13 PROCEDURE — 85025 COMPLETE CBC W/AUTO DIFF WBC: CPT

## 2024-05-13 PROCEDURE — 80061 LIPID PANEL: CPT

## 2024-05-13 PROCEDURE — 84443 ASSAY THYROID STIM HORMONE: CPT

## 2024-05-14 DIAGNOSIS — G40.909 EPILEPSY UNDETERMINED AS TO FOCAL OR GENERALIZED: ICD-10-CM

## 2024-05-14 RX ORDER — LAMOTRIGINE 100 MG/1
TABLET ORAL
Qty: 30 TABLET | Refills: 3 | Status: SHIPPED | OUTPATIENT
Start: 2024-05-14

## 2024-05-16 ENCOUNTER — OFFICE VISIT (OUTPATIENT)
Dept: INTERNAL MEDICINE | Facility: CLINIC | Age: 29
End: 2024-05-16
Payer: MEDICAID

## 2024-05-16 VITALS
WEIGHT: 125 LBS | DIASTOLIC BLOOD PRESSURE: 80 MMHG | BODY MASS INDEX: 18.51 KG/M2 | HEART RATE: 115 BPM | SYSTOLIC BLOOD PRESSURE: 124 MMHG | OXYGEN SATURATION: 98 % | HEIGHT: 69 IN | RESPIRATION RATE: 18 BRPM

## 2024-05-16 DIAGNOSIS — Z00.00 WELLNESS EXAMINATION: Primary | ICD-10-CM

## 2024-05-16 DIAGNOSIS — D69.6 THROMBOCYTOPENIA: ICD-10-CM

## 2024-05-16 DIAGNOSIS — R22.9 SUBCUTANEOUS NODULES: ICD-10-CM

## 2024-05-16 DIAGNOSIS — F84.0 AUTISM: ICD-10-CM

## 2024-05-16 DIAGNOSIS — G40.909 SEIZURE DISORDER: ICD-10-CM

## 2024-05-16 DIAGNOSIS — D64.9 ANEMIA, UNSPECIFIED TYPE: ICD-10-CM

## 2024-05-16 PROCEDURE — 99395 PREV VISIT EST AGE 18-39: CPT | Mod: ,,, | Performed by: INTERNAL MEDICINE

## 2024-05-16 NOTE — PROGRESS NOTES
Subjective:      Patient Care Team:  Alex Mccabe MD as PCP - General (Internal Medicine)      Patient ID: Alex Ng is a 29 y.o. male.    Chief Complaint: Annual Exam (Discuss labs /Has glaucoma in R eye )      Patient is a 29-year-old man who suffers from autism and numerous medical problems comes in for wellness/follow-up.  No new problems according to his mother.  He was developed some subcu nodules that have grown.  He was numerous ones on his right arm and behind the right ear along with a few smaller nodules elsewhere.  They are growing.    Also some issues with minor skin rashes.    Also found to have a cataract in the right eye.  Followed by Ophthalmology.  No surgery planned in the immediate future.      Review of Systems   All other systems reviewed and are negative.       Current Outpatient Medications on File Prior to Visit   Medication Sig Dispense Refill    divalproex ER (DEPAKOTE ER) 250 MG 24 hr tablet TAKE ONE TABLET BY MOUTH EVERY DAY 30 tablet 11    divalproex ER (DEPAKOTE ER) 500 MG Tb24 TAKE 1 TABLET IN THE MORNING AND 2 TABLETS AT BEDTIME 90 tablet 3    esomeprazole (NEXIUM) 40 MG capsule TAKE ONE CAPSULE BY MOUTH EVERY DAY IN THE MORNING BEFORE BREAKFAST 30 capsule 5    lacosamide (VIMPAT) 100 mg Tab TAKE ONE TABLET BY MOUTH TWICE DAILY FOR SEIZURES 60 tablet 3    Lactobacillus acidophilus 2 billion cell Tab Take 1 capsule by mouth once daily.      lamoTRIgine (LAMICTAL) 100 MG tablet TAKE ONE TABLET BY MOUTH EVERY MORNING 30 tablet 3    lamoTRIgine (LAMICTAL) 150 MG Tab TAKE ONE TABLET BY MOUTH IN THE EVENING. 30 tablet 6    mupirocin (BACTROBAN) 2 % ointment Apply topically 3 (three) times daily. 15 g 5    nystatin (MYCOSTATIN) powder Apply topically 2 (two) times daily. 30 g 11    psyllium 0.52 gram capsule Take 0.52 g by mouth every other day.      pyridoxine, vitamin B6, (B-6) 100 MG Tab Take 100 mg by mouth once daily.      risperiDONE (RISPERDAL) 1 MG tablet TAKE ONE  "TABLET BY MOUTH TWICE DAILY 60 tablet 11     No current facility-administered medications on file prior to visit.       Patient Reported Health Risk Assessment       Opioid Screening: Patient medication list reviewed, patient is not taking prescription opioids. Patient is not using additional opioids than prescribed. Patient is at low risk of substance abuse based on this opioid use history.       Objective:      /80 (BP Location: Right arm, Patient Position: Sitting, BP Method: Small (Manual))   Pulse (!) 115   Resp 18   Ht 5' 9.02" (1.753 m)   Wt 56.7 kg (125 lb)   SpO2 98%   BMI 18.45 kg/m²     Physical Exam  Vitals reviewed.   Constitutional:       Appearance: Normal appearance.   HENT:      Head: Normocephalic and atraumatic.      Mouth/Throat:      Pharynx: Oropharynx is clear.   Eyes:      Pupils: Pupils are equal, round, and reactive to light.   Cardiovascular:      Rate and Rhythm: Normal rate and regular rhythm.      Pulses: Normal pulses.      Heart sounds: Normal heart sounds.   Pulmonary:      Breath sounds: Normal breath sounds.   Abdominal:      General: Abdomen is flat.      Palpations: Abdomen is soft.   Musculoskeletal:      Cervical back: Neck supple.   Skin:     General: Skin is warm and dry.      Comments: Multiple soft movable subcutaneous nodules, especially near the right elbow and also behind the right ear.   Neurological:      Mental Status: He is alert.       Laboratory studies reviewed.           No data to display                  5/16/2024     1:00 PM 11/8/2023     1:20 PM 5/2/2023     1:00 PM 11/2/2022     1:00 PM 5/2/2022     1:00 PM   Fall Risk Assessment - Outpatient   Mobility Status Wheelchair Bound Ambulatory Wheelchair Bound Ambulatory Ambulatory w/ assistance   Number of falls 0 0 0 0 0   Identified as fall risk True False True False False                Assessment:       1. Wellness     2. Seizure disorder.  Controlled with multiple meds    3. Mildly abnormal CBC " and persistent the low platelet count.  Likely medication related but stable in no treatment needed     4. Autism.      5. Numerous subcutaneous nodules.  Certainly field benign but a growing            Plan:       Continue same meds.  Refer to general surgery for possible excision of the numerous nodules.  Follow-up with me 6 months with CBC CMP lipid TSH prior        Medicare Annual Wellness and Personalized Prevention Plan:   Fall Risk + Home Safety + Hearing Impairment + Depression Screen + Cognitive Impairment Screen + Health Risk Assessment all reviewed.     The patient has no Health Maintenance topics of status Not Due   The patient's Health Maintenance was reviewed and the following appears to be due at this time:   Health Maintenance Due   Topic Date Due    Hepatitis C Screening  Never done    HIV Screening  Never done    TETANUS VACCINE  05/23/2016    COVID-19 Vaccine (3 - 2023-24 season) 09/01/2023         Advance Care Planning   I attest to discussing Advance Care Planning with patient and/or family member.  Education was provided including the importance of the Health Care Power of , Advance Directives, and/or [place] documentation.  The patient expressed understanding to the importance of this information and discussion.  Length of ACP conversation in minutes:          Follow up in about 6 months (around 11/16/2024). In addition to their scheduled follow up, the patient has also been instructed to follow up on as needed basis.

## 2024-06-03 ENCOUNTER — LAB VISIT (OUTPATIENT)
Dept: LAB | Facility: HOSPITAL | Age: 29
End: 2024-06-03
Attending: SURGERY
Payer: MEDICAID

## 2024-06-03 ENCOUNTER — OFFICE VISIT (OUTPATIENT)
Dept: SURGERY | Facility: CLINIC | Age: 29
End: 2024-06-03
Payer: MEDICAID

## 2024-06-03 VITALS — HEIGHT: 69 IN | BODY MASS INDEX: 18.22 KG/M2 | WEIGHT: 123 LBS

## 2024-06-03 DIAGNOSIS — Z01.818 PREOP EXAMINATION: Primary | ICD-10-CM

## 2024-06-03 DIAGNOSIS — Z01.818 PREOP EXAMINATION: ICD-10-CM

## 2024-06-03 DIAGNOSIS — R22.9 SUBCUTANEOUS NODULES: ICD-10-CM

## 2024-06-03 LAB
ALBUMIN SERPL-MCNC: 3.3 G/DL (ref 3.5–5)
ALBUMIN/GLOB SERPL: 1 RATIO (ref 1.1–2)
ALP SERPL-CCNC: 75 UNIT/L (ref 40–150)
ALT SERPL-CCNC: 23 UNIT/L (ref 0–55)
ANION GAP SERPL CALC-SCNC: 7 MEQ/L
AST SERPL-CCNC: 28 UNIT/L (ref 5–34)
BASOPHILS # BLD AUTO: 0 X10(3)/MCL
BASOPHILS NFR BLD AUTO: 0 %
BILIRUB SERPL-MCNC: 0.2 MG/DL
BUN SERPL-MCNC: 12.3 MG/DL (ref 8.9–20.6)
CALCIUM SERPL-MCNC: 9.4 MG/DL (ref 8.4–10.2)
CHLORIDE SERPL-SCNC: 107 MMOL/L (ref 98–107)
CO2 SERPL-SCNC: 27 MMOL/L (ref 22–29)
CREAT SERPL-MCNC: 0.85 MG/DL (ref 0.73–1.18)
CREAT/UREA NIT SERPL: 14
EOSINOPHIL # BLD AUTO: 0.01 X10(3)/MCL (ref 0–0.9)
EOSINOPHIL NFR BLD AUTO: 0.2 %
ERYTHROCYTE [DISTWIDTH] IN BLOOD BY AUTOMATED COUNT: 17.2 % (ref 11.5–17)
GFR SERPLBLD CREATININE-BSD FMLA CKD-EPI: >60 ML/MIN/1.73/M2
GLOBULIN SER-MCNC: 3.4 GM/DL (ref 2.4–3.5)
GLUCOSE SERPL-MCNC: 72 MG/DL (ref 74–100)
HCT VFR BLD AUTO: 42.2 % (ref 42–52)
HGB BLD-MCNC: 13.4 G/DL (ref 14–18)
IMM GRANULOCYTES # BLD AUTO: 0.01 X10(3)/MCL (ref 0–0.04)
IMM GRANULOCYTES NFR BLD AUTO: 0.2 %
LYMPHOCYTES # BLD AUTO: 1.23 X10(3)/MCL (ref 0.6–4.6)
LYMPHOCYTES NFR BLD AUTO: 30.6 %
MCH RBC QN AUTO: 28.1 PG (ref 27–31)
MCHC RBC AUTO-ENTMCNC: 31.8 G/DL (ref 33–36)
MCV RBC AUTO: 88.5 FL (ref 80–94)
MONOCYTES # BLD AUTO: 0.56 X10(3)/MCL (ref 0.1–1.3)
MONOCYTES NFR BLD AUTO: 13.9 %
NEUTROPHILS # BLD AUTO: 2.21 X10(3)/MCL (ref 2.1–9.2)
NEUTROPHILS NFR BLD AUTO: 55.1 %
NRBC BLD AUTO-RTO: 0 %
PLATELET # BLD AUTO: 111 X10(3)/MCL (ref 130–400)
PMV BLD AUTO: 11.3 FL (ref 7.4–10.4)
POTASSIUM SERPL-SCNC: 3.9 MMOL/L (ref 3.5–5.1)
PROT SERPL-MCNC: 6.7 GM/DL (ref 6.4–8.3)
RBC # BLD AUTO: 4.77 X10(6)/MCL (ref 4.7–6.1)
SODIUM SERPL-SCNC: 141 MMOL/L (ref 136–145)
WBC # SPEC AUTO: 4.02 X10(3)/MCL (ref 4.5–11.5)

## 2024-06-03 PROCEDURE — 99203 OFFICE O/P NEW LOW 30 MIN: CPT | Mod: ,,, | Performed by: SURGERY

## 2024-06-03 PROCEDURE — 1160F RVW MEDS BY RX/DR IN RCRD: CPT | Mod: CPTII,,, | Performed by: SURGERY

## 2024-06-03 PROCEDURE — 36415 COLL VENOUS BLD VENIPUNCTURE: CPT

## 2024-06-03 PROCEDURE — 1159F MED LIST DOCD IN RCRD: CPT | Mod: CPTII,,, | Performed by: SURGERY

## 2024-06-03 PROCEDURE — 3008F BODY MASS INDEX DOCD: CPT | Mod: CPTII,,, | Performed by: SURGERY

## 2024-06-03 NOTE — H&P (VIEW-ONLY)
HISTORY & PHYSICAL  General Surgery    Patient Name: Alex Ng  YOB: 1995    Date: 06/03/2024                   PRESENTING HISTORY     Chief Complaint/Reason for Admission: Consult (Multiple cysts)       History of Present Illness:  Mr. Alex Ng is a 29 y.o. male  with special needs referred for evaluation of mass. He states he  reports a mass on right face and right arm that has been present for quite some time. It has continued to increase in size and is now causing some discomfort. Denies trauma. He denies chest pain, shortness of breath, fever or chills.    Review of Systems:  12 point ROS negative except as stated in HPI    PAST HISTORY:     Past Medical History:   Diagnosis Date    Autism     Dysphagia     narrow esophagus    GERD (gastroesophageal reflux disease)     Lipoma     Seizures     Skin rash     Unspecified cataract      Past Surgical History:   Procedure Laterality Date    EYE SURGERY  1994    GASTRECTOMY       No family history on file.  Social History     Socioeconomic History    Marital status: Single   Tobacco Use    Smoking status: Never    Smokeless tobacco: Never   Substance and Sexual Activity    Alcohol use: Never    Drug use: Never    Sexual activity: Never   Social History Narrative    ** Merged History Encounter **          Social Determinants of Health     Financial Resource Strain: High Risk (6/2/2024)    Overall Financial Resource Strain (CARDIA)     Difficulty of Paying Living Expenses: Very hard   Food Insecurity: Patient Declined (6/2/2024)    Hunger Vital Sign     Worried About Running Out of Food in the Last Year: Patient declined     Ran Out of Food in the Last Year: Patient declined   Physical Activity: Unknown (6/2/2024)    Exercise Vital Sign     Days of Exercise per Week: Patient declined     Minutes of Exercise per Session: 20 min   Recent Concern: Physical Activity - Insufficiently Active (5/16/2024)    Exercise Vital Sign     Days of  "Exercise per Week: 2 days     Minutes of Exercise per Session: 20 min   Stress: Patient Declined (6/2/2024)    [employer] [place] [employer] - Occupational Stress Questionnaire     Feeling of Stress : Patient declined   Housing Stability: Unknown (6/2/2024)    Housing Stability Vital Sign     Unable to Pay for Housing in the Last Year: Patient declined     Homeless in the Last Year: Patient unable to answer       MEDICATIONS & ALLERGIES:     Current Outpatient Medications on File Prior to Visit   Medication Sig    divalproex ER (DEPAKOTE ER) 250 MG 24 hr tablet TAKE ONE TABLET BY MOUTH EVERY DAY    divalproex ER (DEPAKOTE ER) 500 MG Tb24 TAKE 1 TABLET IN THE MORNING AND 2 TABLETS AT BEDTIME    esomeprazole (NEXIUM) 40 MG capsule TAKE ONE CAPSULE BY MOUTH EVERY DAY IN THE MORNING BEFORE BREAKFAST    lacosamide (VIMPAT) 100 mg Tab TAKE ONE TABLET BY MOUTH TWICE DAILY FOR SEIZURES    Lactobacillus acidophilus 2 billion cell Tab Take 1 capsule by mouth once daily.    lamoTRIgine (LAMICTAL) 100 MG tablet TAKE ONE TABLET BY MOUTH EVERY MORNING    lamoTRIgine (LAMICTAL) 150 MG Tab TAKE ONE TABLET BY MOUTH IN THE EVENING.    mupirocin (BACTROBAN) 2 % ointment Apply topically 3 (three) times daily.    nystatin (MYCOSTATIN) powder Apply topically 2 (two) times daily.    pyridoxine, vitamin B6, (B-6) 100 MG Tab Take 100 mg by mouth once daily.    risperiDONE (RISPERDAL) 1 MG tablet TAKE ONE TABLET BY MOUTH TWICE DAILY    psyllium 0.52 gram capsule Take 0.52 g by mouth every other day. (Patient not taking: Reported on 6/3/2024)     No current facility-administered medications on file prior to visit.     Review of patient's allergies indicates:   Allergen Reactions    Adhesive     Dexamethasone     Gluten Diarrhea    Sulfa (sulfonamide antibiotics) Rash       OBJECTIVE:   Visit Vitals  Ht 5' 9" (1.753 m)   Wt 55.8 kg (123 lb)   BMI 18.16 kg/m²       Physical Exam:  General:  Well developed, well nourished, no acute " distress  HEENT:  Normocephalic, atraumatic, PERRL, EOMI, clear sclera, ears normal, neck supple, throat clear without erythema or exudates  CVS:  RRR, S1 and S2 normal, no murmurs, rubs, gallops  Resp:  Lungs clear to auscultation, no wheezes, rales, rhonchi, cough  GI:  Abdomen soft, non-tender, non-distended, normoactive bowel sounds, no masses  :  Deferred  MSK:  No muscle atrophy, cyanosis, peripheral edema, full range of motion  Skin:  No rashes, ulcers, erythema. + Right cheek, 2cm, NTTP; RUE, 7cm, NTTP  Neuro:  CNII-XII grossly intact  Psych:  Alert and oriented to person, place, and time    Results:  I have independently reviewed all pertinent lab and radiologic studies relevant to general/bariatric surgery.    VISIT DIAGNOSES:       ICD-10-CM ICD-9-CM   1. Preop examination  Z01.818 V72.84   2. Subcutaneous nodules  R22.9 782.2       ASSESSMENT/PLAN:   Mr. Alex Ng is a 29 y.o. male with soft tissue masses    -  Risks / Benefits of surgery was discussed with the patients mother who was present and wishes to proceed with removal  - Excision of Soft Tissue Masses   - Preoperative workup

## 2024-06-03 NOTE — PROGRESS NOTES
HISTORY & PHYSICAL  General Surgery    Patient Name: Alex Ng  YOB: 1995    Date: 06/03/2024                   PRESENTING HISTORY     Chief Complaint/Reason for Admission: Consult (Multiple cysts)       History of Present Illness:  Mr. Alex Ng is a 29 y.o. male  with special needs referred for evaluation of mass. He states he  reports a mass on right face and right arm that has been present for quite some time. It has continued to increase in size and is now causing some discomfort. Denies trauma. He denies chest pain, shortness of breath, fever or chills.    Review of Systems:  12 point ROS negative except as stated in HPI    PAST HISTORY:     Past Medical History:   Diagnosis Date    Autism     Dysphagia     narrow esophagus    GERD (gastroesophageal reflux disease)     Lipoma     Seizures     Skin rash     Unspecified cataract      Past Surgical History:   Procedure Laterality Date    EYE SURGERY  1994    GASTRECTOMY       No family history on file.  Social History     Socioeconomic History    Marital status: Single   Tobacco Use    Smoking status: Never    Smokeless tobacco: Never   Substance and Sexual Activity    Alcohol use: Never    Drug use: Never    Sexual activity: Never   Social History Narrative    ** Merged History Encounter **          Social Determinants of Health     Financial Resource Strain: High Risk (6/2/2024)    Overall Financial Resource Strain (CARDIA)     Difficulty of Paying Living Expenses: Very hard   Food Insecurity: Patient Declined (6/2/2024)    Hunger Vital Sign     Worried About Running Out of Food in the Last Year: Patient declined     Ran Out of Food in the Last Year: Patient declined   Physical Activity: Unknown (6/2/2024)    Exercise Vital Sign     Days of Exercise per Week: Patient declined     Minutes of Exercise per Session: 20 min   Recent Concern: Physical Activity - Insufficiently Active (5/16/2024)    Exercise Vital Sign     Days of  "Exercise per Week: 2 days     Minutes of Exercise per Session: 20 min   Stress: Patient Declined (6/2/2024)    [employer] - Occupational Stress Questionnaire     Feeling of Stress : Patient declined   Housing Stability: Unknown (6/2/2024)    Housing Stability Vital Sign     Unable to Pay for Housing in the Last Year: Patient declined     Homeless in the Last Year: Patient unable to answer       MEDICATIONS & ALLERGIES:     Current Outpatient Medications on File Prior to Visit   Medication Sig    divalproex ER (DEPAKOTE ER) 250 MG 24 hr tablet TAKE ONE TABLET BY MOUTH EVERY DAY    divalproex ER (DEPAKOTE ER) 500 MG Tb24 TAKE 1 TABLET IN THE MORNING AND 2 TABLETS AT BEDTIME    esomeprazole (NEXIUM) 40 MG capsule TAKE ONE CAPSULE BY MOUTH EVERY DAY IN THE MORNING BEFORE BREAKFAST    lacosamide (VIMPAT) 100 mg Tab TAKE ONE TABLET BY MOUTH TWICE DAILY FOR SEIZURES    Lactobacillus acidophilus 2 billion cell Tab Take 1 capsule by mouth once daily.    lamoTRIgine (LAMICTAL) 100 MG tablet TAKE ONE TABLET BY MOUTH EVERY MORNING    lamoTRIgine (LAMICTAL) 150 MG Tab TAKE ONE TABLET BY MOUTH IN THE EVENING.    mupirocin (BACTROBAN) 2 % ointment Apply topically 3 (three) times daily.    nystatin (MYCOSTATIN) powder Apply topically 2 (two) times daily.    pyridoxine, vitamin B6, (B-6) 100 MG Tab Take 100 mg by mouth once daily.    risperiDONE (RISPERDAL) 1 MG tablet TAKE ONE TABLET BY MOUTH TWICE DAILY    psyllium 0.52 gram capsule Take 0.52 g by mouth every other day. (Patient not taking: Reported on 6/3/2024)     No current facility-administered medications on file prior to visit.     Review of patient's allergies indicates:   Allergen Reactions    Adhesive     Dexamethasone     Gluten Diarrhea    Sulfa (sulfonamide antibiotics) Rash       OBJECTIVE:   Visit Vitals  Ht 5' 9" (1.753 m)   Wt 55.8 kg (123 lb)   BMI 18.16 kg/m²       Physical Exam:  General:  Well developed, well nourished, no acute " distress  HEENT:  Normocephalic, atraumatic, PERRL, EOMI, clear sclera, ears normal, neck supple, throat clear without erythema or exudates  CVS:  RRR, S1 and S2 normal, no murmurs, rubs, gallops  Resp:  Lungs clear to auscultation, no wheezes, rales, rhonchi, cough  GI:  Abdomen soft, non-tender, non-distended, normoactive bowel sounds, no masses  :  Deferred  MSK:  No muscle atrophy, cyanosis, peripheral edema, full range of motion  Skin:  No rashes, ulcers, erythema. + Right cheek, 2cm, NTTP; RUE, 7cm, NTTP  Neuro:  CNII-XII grossly intact  Psych:  Alert and oriented to person, place, and time    Results:  I have independently reviewed all pertinent lab and radiologic studies relevant to general/bariatric surgery.    VISIT DIAGNOSES:       ICD-10-CM ICD-9-CM   1. Preop examination  Z01.818 V72.84   2. Subcutaneous nodules  R22.9 782.2       ASSESSMENT/PLAN:   Mr. Alex Ng is a 29 y.o. male with soft tissue masses    -  Risks / Benefits of surgery was discussed with the patients mother who was present and wishes to proceed with removal  - Excision of Soft Tissue Masses   - Preoperative workup

## 2024-06-05 DIAGNOSIS — R22.9 SUBCUTANEOUS NODULES: Primary | ICD-10-CM

## 2024-06-05 RX ORDER — SODIUM CHLORIDE, SODIUM LACTATE, POTASSIUM CHLORIDE, CALCIUM CHLORIDE 600; 310; 30; 20 MG/100ML; MG/100ML; MG/100ML; MG/100ML
INJECTION, SOLUTION INTRAVENOUS CONTINUOUS
Status: CANCELLED | OUTPATIENT
Start: 2024-06-05

## 2024-06-05 RX ORDER — ENOXAPARIN SODIUM 100 MG/ML
40 INJECTION SUBCUTANEOUS EVERY 24 HOURS
Status: CANCELLED | OUTPATIENT
Start: 2024-06-05

## 2024-06-11 DIAGNOSIS — G40.909 SEIZURE DISORDER: ICD-10-CM

## 2024-06-11 DIAGNOSIS — F84.0 AUTISTIC DISORDER: ICD-10-CM

## 2024-06-11 RX ORDER — LACOSAMIDE 100 MG/1
TABLET ORAL
Qty: 60 TABLET | Refills: 3 | Status: SHIPPED | OUTPATIENT
Start: 2024-06-11

## 2024-06-11 RX ORDER — DIVALPROEX SODIUM 500 MG/1
TABLET, FILM COATED, EXTENDED RELEASE ORAL
Qty: 90 TABLET | Refills: 3 | Status: SHIPPED | OUTPATIENT
Start: 2024-06-11

## 2024-06-16 ENCOUNTER — ANESTHESIA EVENT (OUTPATIENT)
Dept: SURGERY | Facility: HOSPITAL | Age: 29
End: 2024-06-16
Payer: MEDICAID

## 2024-06-16 NOTE — ANESTHESIA PREPROCEDURE EVALUATION
06/16/2024  Alex Ng is a 29 y.o., male.    Alex Ng    Pre-op Diagnosis: Subcutaneous nodules [R22.9]    Procedure(s):  EXCISION, MASS, UPPER EXTREMITY  Right  EXCISION, MASS, FACE Right     Review of patient's allergies indicates:   Allergen Reactions    Adhesive     Dexamethasone     Gluten Diarrhea    Sulfa (sulfonamide antibiotics) Rash       Current Outpatient Medications   Medication Instructions    divalproex ER (DEPAKOTE ER) 250 MG 24 hr tablet TAKE ONE TABLET BY MOUTH EVERY DAY    divalproex ER (DEPAKOTE ER) 500 MG Tb24 24 hr tablet TAKE 1 TABLET IN THE MORNING AND 2 TABLETS AT BEDTIME    esomeprazole (NEXIUM) 40 MG capsule TAKE ONE CAPSULE BY MOUTH EVERY DAY IN THE MORNING BEFORE BREAKFAST    lacosamide (VIMPAT) 100 mg Tab TAKE ONE TABLET BY MOUTH TWICE DAILY FOR SEIZURES    Lactobacillus acidophilus 2 billion cell Tab 1 capsule, Oral, Daily    lamoTRIgine (LAMICTAL) 100 MG tablet TAKE ONE TABLET BY MOUTH EVERY MORNING    lamoTRIgine (LAMICTAL) 150 MG Tab TAKE ONE TABLET BY MOUTH IN THE EVENING.    mupirocin (BACTROBAN) 2 % ointment Topical (Top), 3 times daily    nystatin (MYCOSTATIN) powder Topical (Top), 2 times daily    psyllium 0.52 g, Oral, As needed (PRN)    pyridoxine (vitamin B6) (B-6) 100 mg, Oral, Daily    risperiDONE (RISPERDAL) 1 MG tablet TAKE ONE TABLET BY MOUTH TWICE DAILY       EXCISION, MASS, UPPER EXTREMITY  Right;EXCISION, MASS, FACE*    Past Medical History:   Diagnosis Date    Autism      non verbal     Dysphagia     narrow esophagus    GERD (gastroesophageal reflux disease)     Lipoma     Seizures     Skin rash     Unspecified cataract     Unspecified cataract     right eye   PMH includes Esophageal Strictures with Esophageal Dilatations    Past Surgical History:   Procedure Laterality Date    EYE SURGERY  1994    GASTRECTOMY      PSH includes Esophageal  Dilatations & Dental work under GA    Lab Results   Component Value Date    WBC 4.02 (L) 06/03/2024    HGB 13.4 (L) 06/03/2024    HCT 42.2 06/03/2024    MCV 88.5 06/03/2024     (L) 06/03/2024          BMP  Lab Results   Component Value Date     06/03/2024    K 3.9 06/03/2024     06/03/2024    CO2 27 06/03/2024    BUN 12.3 06/03/2024    CREATININE 0.85 06/03/2024    CALCIUM 9.4 06/03/2024    EGFRNONAA >60 04/28/2022                   Pre-op Assessment    I have reviewed the Patient Summary Reports.    I have reviewed the NPO Status.   I have reviewed the Medications.     Review of Systems  Anesthesia Hx:  No problems with previous Anesthesia             Denies Family Hx of Anesthesia complications.    Denies Personal Hx of Anesthesia complications.                    Social:  Non-Smoker       Cardiovascular:  Exercise tolerance: poor          Denies Angina.   Denies Orthopnea.  Denies PND.    Denies KAUR.    Functional Capacity low / < 4 METS                         Hepatic/GI:     GERD   DYSPHAGIA          Musculoskeletal:  Musculoskeletal Normal                Neurological:  Denies TIA.  Denies CVA.    Seizures                                Psych:  Psychiatric Normal     AUTISM               Physical Exam  General: Well nourished, Alert and Oriented    Airway:  Mallampati: III   Mouth Opening: Normal  TM Distance: Normal  Tongue: Normal  Neck ROM: Normal ROM    Dental:  Intact    Chest/Lungs:  Clear to auscultation    Heart:  Rate: Normal  Rhythm: Regular Rhythm  No pretibial edema  No carotid bruits      Anesthesia Plan  Type of Anesthesia, risks & benefits discussed:    Anesthesia Type: Gen ETT  Intra-op Monitoring Plan: Standard ASA Monitors  Post Op Pain Control Plan: multimodal analgesia  Induction:  IV  Airway Plan: Direct, Post-Induction  Informed Consent: Informed consent signed with the Patient and all parties understand the risks and agree with anesthesia plan.  All questions answered.  Patient consented to blood products? Yes  ASA Score: 3  Day of Surgery Review of History & Physical: H&P Update referred to the surgeon/provider.    Ready For Surgery From Anesthesia Perspective.     .

## 2024-06-17 ENCOUNTER — ANESTHESIA (OUTPATIENT)
Dept: SURGERY | Facility: HOSPITAL | Age: 29
End: 2024-06-17
Payer: MEDICAID

## 2024-06-17 ENCOUNTER — HOSPITAL ENCOUNTER (OUTPATIENT)
Facility: HOSPITAL | Age: 29
Discharge: HOME | End: 2024-06-17
Attending: SURGERY | Admitting: SURGERY
Payer: MEDICAID

## 2024-06-17 DIAGNOSIS — L98.9 SKIN LESION: Primary | ICD-10-CM

## 2024-06-17 DIAGNOSIS — R22.9 SUBCUTANEOUS NODULES: ICD-10-CM

## 2024-06-17 PROCEDURE — 63600175 PHARM REV CODE 636 W HCPCS: Performed by: NURSE ANESTHETIST, CERTIFIED REGISTERED

## 2024-06-17 PROCEDURE — 11443 EXC FACE-MM B9+MARG 2.1-3 CM: CPT | Mod: 51,,, | Performed by: SURGERY

## 2024-06-17 PROCEDURE — 63600175 PHARM REV CODE 636 W HCPCS: Performed by: SURGERY

## 2024-06-17 PROCEDURE — 71000016 HC POSTOP RECOV ADDL HR: Performed by: SURGERY

## 2024-06-17 PROCEDURE — 11444 EXC FACE-MM B9+MARG 3.1-4 CM: CPT | Mod: ,,, | Performed by: SURGERY

## 2024-06-17 PROCEDURE — 25000003 PHARM REV CODE 250: Performed by: NURSE ANESTHETIST, CERTIFIED REGISTERED

## 2024-06-17 PROCEDURE — 36000707: Performed by: SURGERY

## 2024-06-17 PROCEDURE — 25000003 PHARM REV CODE 250: Performed by: ANESTHESIOLOGY

## 2024-06-17 PROCEDURE — 37000009 HC ANESTHESIA EA ADD 15 MINS: Performed by: SURGERY

## 2024-06-17 PROCEDURE — 71000039 HC RECOVERY, EACH ADD'L HOUR: Performed by: SURGERY

## 2024-06-17 PROCEDURE — 25000003 PHARM REV CODE 250: Performed by: SURGERY

## 2024-06-17 PROCEDURE — 36000706: Performed by: SURGERY

## 2024-06-17 PROCEDURE — 71000033 HC RECOVERY, INTIAL HOUR: Performed by: SURGERY

## 2024-06-17 PROCEDURE — 37000008 HC ANESTHESIA 1ST 15 MINUTES: Performed by: SURGERY

## 2024-06-17 PROCEDURE — 71000015 HC POSTOP RECOV 1ST HR: Performed by: SURGERY

## 2024-06-17 RX ORDER — HYDROMORPHONE HYDROCHLORIDE 2 MG/ML
0.2 INJECTION, SOLUTION INTRAMUSCULAR; INTRAVENOUS; SUBCUTANEOUS EVERY 5 MIN PRN
Status: DISCONTINUED | OUTPATIENT
Start: 2024-06-17 | End: 2024-06-17 | Stop reason: HOSPADM

## 2024-06-17 RX ORDER — IPRATROPIUM BROMIDE AND ALBUTEROL SULFATE 2.5; .5 MG/3ML; MG/3ML
3 SOLUTION RESPIRATORY (INHALATION)
Status: DISCONTINUED | OUTPATIENT
Start: 2024-06-17 | End: 2024-06-17 | Stop reason: HOSPADM

## 2024-06-17 RX ORDER — MIDAZOLAM HYDROCHLORIDE 2 MG/2ML
2 INJECTION, SOLUTION INTRAMUSCULAR; INTRAVENOUS
Status: ACTIVE | OUTPATIENT
Start: 2024-06-17 | End: 2024-06-17

## 2024-06-17 RX ORDER — MIDAZOLAM HYDROCHLORIDE 2 MG/ML
10 SYRUP ORAL ONCE
Status: COMPLETED | OUTPATIENT
Start: 2024-06-17 | End: 2024-06-17

## 2024-06-17 RX ORDER — ONDANSETRON HYDROCHLORIDE 2 MG/ML
INJECTION, SOLUTION INTRAMUSCULAR; INTRAVENOUS
Status: DISCONTINUED | OUTPATIENT
Start: 2024-06-17 | End: 2024-06-17

## 2024-06-17 RX ORDER — ACETAMINOPHEN 500 MG
1000 TABLET ORAL ONCE
Status: DISCONTINUED | OUTPATIENT
Start: 2024-06-17 | End: 2024-06-17

## 2024-06-17 RX ORDER — PROPOFOL 10 MG/ML
VIAL (ML) INTRAVENOUS
Status: DISCONTINUED | OUTPATIENT
Start: 2024-06-17 | End: 2024-06-17

## 2024-06-17 RX ORDER — ENOXAPARIN SODIUM 100 MG/ML
40 INJECTION SUBCUTANEOUS EVERY 24 HOURS
Status: DISCONTINUED | OUTPATIENT
Start: 2024-06-17 | End: 2024-06-17

## 2024-06-17 RX ORDER — MEPERIDINE HYDROCHLORIDE 25 MG/ML
50 INJECTION INTRAMUSCULAR; INTRAVENOUS; SUBCUTANEOUS
Status: DISCONTINUED | OUTPATIENT
Start: 2024-06-17 | End: 2024-06-17 | Stop reason: HOSPADM

## 2024-06-17 RX ORDER — SODIUM CITRATE AND CITRIC ACID MONOHYDRATE 334; 500 MG/5ML; MG/5ML
30 SOLUTION ORAL ONCE
Status: DISCONTINUED | OUTPATIENT
Start: 2024-06-17 | End: 2024-06-17

## 2024-06-17 RX ORDER — MEPERIDINE HYDROCHLORIDE 25 MG/ML
12.5 INJECTION INTRAMUSCULAR; INTRAVENOUS; SUBCUTANEOUS EVERY 10 MIN PRN
Status: DISCONTINUED | OUTPATIENT
Start: 2024-06-17 | End: 2024-06-17 | Stop reason: HOSPADM

## 2024-06-17 RX ORDER — BUPIVACAINE HYDROCHLORIDE AND EPINEPHRINE 5; 5 MG/ML; UG/ML
INJECTION, SOLUTION EPIDURAL; INTRACAUDAL; PERINEURAL
Status: DISCONTINUED | OUTPATIENT
Start: 2024-06-17 | End: 2024-06-17 | Stop reason: HOSPADM

## 2024-06-17 RX ORDER — SODIUM CHLORIDE, SODIUM LACTATE, POTASSIUM CHLORIDE, CALCIUM CHLORIDE 600; 310; 30; 20 MG/100ML; MG/100ML; MG/100ML; MG/100ML
INJECTION, SOLUTION INTRAVENOUS CONTINUOUS
Status: DISCONTINUED | OUTPATIENT
Start: 2024-06-17 | End: 2024-06-17 | Stop reason: HOSPADM

## 2024-06-17 RX ORDER — TRAMADOL HYDROCHLORIDE 50 MG/1
50 TABLET ORAL EVERY 4 HOURS PRN
Status: DISCONTINUED | OUTPATIENT
Start: 2024-06-17 | End: 2024-06-17 | Stop reason: HOSPADM

## 2024-06-17 RX ORDER — ONDANSETRON HYDROCHLORIDE 2 MG/ML
4 INJECTION, SOLUTION INTRAVENOUS EVERY 4 HOURS PRN
Status: DISCONTINUED | OUTPATIENT
Start: 2024-06-17 | End: 2024-06-17 | Stop reason: HOSPADM

## 2024-06-17 RX ORDER — ONDANSETRON HYDROCHLORIDE 2 MG/ML
4 INJECTION, SOLUTION INTRAVENOUS DAILY PRN
Status: DISCONTINUED | OUTPATIENT
Start: 2024-06-17 | End: 2024-06-17 | Stop reason: HOSPADM

## 2024-06-17 RX ORDER — LIDOCAINE HYDROCHLORIDE 10 MG/ML
1 INJECTION, SOLUTION EPIDURAL; INFILTRATION; INTRACAUDAL; PERINEURAL ONCE
Status: DISCONTINUED | OUTPATIENT
Start: 2024-06-17 | End: 2024-06-17 | Stop reason: HOSPADM

## 2024-06-17 RX ORDER — BUPIVACAINE HYDROCHLORIDE AND EPINEPHRINE 5; 5 MG/ML; UG/ML
INJECTION, SOLUTION EPIDURAL; INTRACAUDAL; PERINEURAL
Status: DISCONTINUED
Start: 2024-06-17 | End: 2024-06-17 | Stop reason: HOSPADM

## 2024-06-17 RX ORDER — CEFAZOLIN 2 G/1
2 INJECTION, POWDER, FOR SOLUTION INTRAMUSCULAR; INTRAVENOUS ONCE
Status: COMPLETED | OUTPATIENT
Start: 2024-06-17 | End: 2024-06-17

## 2024-06-17 RX ORDER — SODIUM CHLORIDE, SODIUM GLUCONATE, SODIUM ACETATE, POTASSIUM CHLORIDE AND MAGNESIUM CHLORIDE 30; 37; 368; 526; 502 MG/100ML; MG/100ML; MG/100ML; MG/100ML; MG/100ML
INJECTION, SOLUTION INTRAVENOUS CONTINUOUS
Status: DISCONTINUED | OUTPATIENT
Start: 2024-06-17 | End: 2024-06-17 | Stop reason: HOSPADM

## 2024-06-17 RX ORDER — PROCHLORPERAZINE EDISYLATE 5 MG/ML
5 INJECTION INTRAMUSCULAR; INTRAVENOUS EVERY 30 MIN PRN
Status: DISCONTINUED | OUTPATIENT
Start: 2024-06-17 | End: 2024-06-17 | Stop reason: HOSPADM

## 2024-06-17 RX ORDER — ENOXAPARIN SODIUM 100 MG/ML
40 INJECTION SUBCUTANEOUS ONCE
Status: COMPLETED | OUTPATIENT
Start: 2024-06-17 | End: 2024-06-17

## 2024-06-17 RX ORDER — ROCURONIUM BROMIDE 10 MG/ML
INJECTION, SOLUTION INTRAVENOUS
Status: DISCONTINUED | OUTPATIENT
Start: 2024-06-17 | End: 2024-06-17

## 2024-06-17 RX ORDER — TRAMADOL HYDROCHLORIDE 50 MG/1
50 TABLET ORAL EVERY 6 HOURS PRN
Qty: 20 TABLET | Refills: 0 | Status: SHIPPED | OUTPATIENT
Start: 2024-06-17

## 2024-06-17 RX ORDER — LIDOCAINE HYDROCHLORIDE 10 MG/ML
INJECTION, SOLUTION EPIDURAL; INFILTRATION; INTRACAUDAL; PERINEURAL
Status: DISCONTINUED | OUTPATIENT
Start: 2024-06-17 | End: 2024-06-17

## 2024-06-17 RX ORDER — FENTANYL CITRATE 50 UG/ML
INJECTION, SOLUTION INTRAMUSCULAR; INTRAVENOUS
Status: DISCONTINUED | OUTPATIENT
Start: 2024-06-17 | End: 2024-06-17

## 2024-06-17 RX ORDER — PHENYLEPHRINE HYDROCHLORIDE 10 MG/ML
INJECTION INTRAVENOUS
Status: DISCONTINUED | OUTPATIENT
Start: 2024-06-17 | End: 2024-06-17

## 2024-06-17 RX ORDER — HYDROCODONE BITARTRATE AND ACETAMINOPHEN 7.5; 325 MG/1; MG/1
1 TABLET ORAL EVERY 6 HOURS PRN
Status: DISCONTINUED | OUTPATIENT
Start: 2024-06-17 | End: 2024-06-17 | Stop reason: HOSPADM

## 2024-06-17 RX ORDER — DIPHENHYDRAMINE HYDROCHLORIDE 50 MG/ML
25 INJECTION INTRAMUSCULAR; INTRAVENOUS EVERY 6 HOURS PRN
Status: DISCONTINUED | OUTPATIENT
Start: 2024-06-17 | End: 2024-06-17 | Stop reason: HOSPADM

## 2024-06-17 RX ADMIN — LIDOCAINE HYDROCHLORIDE 4 ML: 10 INJECTION, SOLUTION EPIDURAL; INFILTRATION; INTRACAUDAL; PERINEURAL at 12:06

## 2024-06-17 RX ADMIN — ENOXAPARIN SODIUM 40 MG: 40 INJECTION SUBCUTANEOUS at 11:06

## 2024-06-17 RX ADMIN — SODIUM CHLORIDE, POTASSIUM CHLORIDE, SODIUM LACTATE AND CALCIUM CHLORIDE: 600; 310; 30; 20 INJECTION, SOLUTION INTRAVENOUS at 12:06

## 2024-06-17 RX ADMIN — CEFAZOLIN 2 G: 2 INJECTION, POWDER, FOR SOLUTION INTRAMUSCULAR; INTRAVENOUS at 12:06

## 2024-06-17 RX ADMIN — PHENYLEPHRINE HYDROCHLORIDE 100 MCG: 10 INJECTION INTRAVENOUS at 01:06

## 2024-06-17 RX ADMIN — SUGAMMADEX 200 MG: 100 INJECTION, SOLUTION INTRAVENOUS at 01:06

## 2024-06-17 RX ADMIN — PROPOFOL 130 MG: 10 INJECTION, EMULSION INTRAVENOUS at 12:06

## 2024-06-17 RX ADMIN — MIDAZOLAM HYDROCHLORIDE 10 MG: 2 SYRUP ORAL at 12:06

## 2024-06-17 RX ADMIN — ROCURONIUM BROMIDE 40 MG: 50 INJECTION INTRAVENOUS at 12:06

## 2024-06-17 RX ADMIN — ONDANSETRON 4 MG: 2 INJECTION INTRAMUSCULAR; INTRAVENOUS at 01:06

## 2024-06-17 RX ADMIN — FENTANYL CITRATE 50 MCG: 50 INJECTION, SOLUTION INTRAMUSCULAR; INTRAVENOUS at 12:06

## 2024-06-17 NOTE — DISCHARGE INSTRUCTIONS
Post Operative Instructions for Lipoma/Skin Mass Removal       Dr. Fountain's Sutured Wound Care Instructions:    - Keep surgical dressing clean and dry for 48 hours post op, then ok to remove dressing and shower.  - Wash incision daily with antibacterial soap and water. Pat dry. Leave open to air.  - Do not remove steri strips. Steri-strips will fall off within 1-2 weeks.   - Activity as tolerated.   - Regular diet.    Lipoma Removal, Care After  This sheet gives you information about how to care for yourself after your procedure. Your health care provider may also give you more specific instructions. If you have problems or questions, contact your health care provider.  What can I expect after the procedure?  After the procedure, it is common to have:   Mild pain.   Swelling.   Bruising.  Follow these instructions at home:  Bathing     Do not take baths, swim, or use a hot tub for 2 weeks after surgery, ensure incision(s) have completely closed prior to bathing/swimming. Ask your health care provider if you may take showers. You may only be allowed to take sponge baths.  Incision care     Follow instructions from your health care provider about how to take care of your incision. Make sure you:  ? Wash your hands with soap and water for at least 20 seconds before and after you change your dressing. If soap and water are not available, use hand .   Check your incision area every day for signs of infection. Check for:  ? More redness, swelling, or pain.  ? Fluid or blood.  ? Warmth.  ? Pus or a bad smell.  Medicines   Take over-the-counter and prescription medicines only as told by your health care provider.   If you were prescribed an antibiotic medicine, use it as told by your health care provider. Do not stop using the antibiotic even if you start to feel better.  General instructions     If you were given a sedative during the procedure, it can affect you for several hours. Do not drive or operate  machinery until your health care provider says that it is safe.   Do not use any products that contain nicotine or tobacco, such as cigarettes, e-cigarettes, and chewing tobacco. These can delay healing. If you need help quitting, ask your health care provider.   Return to your normal activities as told by your health care provider. Ask your health care provider what activities are safe for you.   Keep all follow-up visits as told by your health care provider. This is important.  Contact a health care provider if:   You have more redness, swelling, or pain around your incision.   You have fluid or blood coming from your incision.   Your incision feels warm to the touch.   You have pus or a bad smell coming from your incision.   You have pain that does not get better with medicine.  Get help right away if:   You have chills or a fever.   You have severe pain.  Summary   After the procedure, it is common to have mild pain, swelling, and bruising.   Follow instructions from your health care provider about how to take care of your incision.   Check your incision area every day for signs of infection.   Contact a health care provider if you have more redness, swelling, or pain around your incision.  This information is not intended to replace advice given to you by your health care provider. Make sure you discuss any questions you have with your health care provider.

## 2024-06-17 NOTE — PLAN OF CARE
Pt. Resting comfortably, VSS, no s/s distress, Ann at acceptable level for transfer to phase II, Criteria met for anesthesia release per Dr. Moreno.

## 2024-06-17 NOTE — TRANSFER OF CARE
"Anesthesia Transfer of Care Note    Patient: Alex Ng    Procedure(s) Performed: Procedure(s) (LRB):  EXCISION, MASS, UPPER EXTREMITY  Right (Right)  EXCISION, MASS, FACE Right (Right)    Patient location: PACU    Anesthesia Type: general    Transport from OR: Transported from OR on room air with adequate spontaneous ventilation    Post pain: adequate analgesia    Post assessment: no apparent anesthetic complications    Post vital signs: stable    Level of consciousness: awake and sedated    Nausea/Vomiting: no nausea/vomiting    Complications: none    Transfer of care protocol was followed      Last vitals: Visit Vitals  /66   Pulse (!) 111   Temp 36.6 °C (97.8 °F)   Resp 18   Ht 5' 6" (1.676 m)   Wt 56.7 kg (125 lb)   SpO2 96%   BMI 20.18 kg/m²     "

## 2024-06-17 NOTE — DISCHARGE SUMMARY
[facility] Surgical - Periop Services  Discharge Note  Short Stay    Procedure(s) (LRB):  EXCISION, MASS, UPPER EXTREMITY  Right (Right)  EXCISION, MASS, FACE Right (Right)      OUTCOME: Patient tolerated treatment/procedure well without complication and is now ready for discharge.    DISPOSITION: Home or Self Care    FINAL DIAGNOSIS:  Subcutaneous nodules    FOLLOWUP: In clinic    DISCHARGE INSTRUCTIONS:  No discharge procedures on file.     TIME SPENT ON DISCHARGE:      minutes

## 2024-06-18 NOTE — ANESTHESIA POSTPROCEDURE EVALUATION
Anesthesia Post Evaluation    Patient: Alex Ng    Procedure(s) Performed: Procedure(s) (LRB):  EXCISION, MASS, UPPER EXTREMITY  Right (Right)  EXCISION, MASS, FACE Right (Right)    Final Anesthesia Type: general      Patient location during evaluation: PACU  Patient participation: Yes- Able to Participate  Level of consciousness: awake and alert and oriented  Post-procedure vital signs: reviewed and stable  Pain management: adequate  Airway patency: patent    PONV status at discharge: No PONV  Anesthetic complications: no      Cardiovascular status: hemodynamically stable  Respiratory status: unassisted  Hydration status: euvolemic  Follow-up not needed.  Comments: Neuro unchanged from preop              Vitals Value Taken Time   BP 90/60 06/17/24 1558   Temp 36.6 °C (97.9 °F) 06/17/24 1450   Pulse 71 06/17/24 1558   Resp 14 06/17/24 1458   SpO2 95 % 06/17/24 1558         Event Time   Out of Recovery 14:54:00         Pain/Ann Score: Ann Score: 9 (6/17/2024  3:00 PM)  Modified Ann Score: 20 (6/17/2024  4:31 PM)

## 2024-06-19 LAB — PSYCHE PATHOLOGY RESULT: NORMAL

## 2024-06-20 VITALS
RESPIRATION RATE: 14 BRPM | DIASTOLIC BLOOD PRESSURE: 60 MMHG | SYSTOLIC BLOOD PRESSURE: 90 MMHG | HEART RATE: 71 BPM | HEIGHT: 66 IN | OXYGEN SATURATION: 95 % | WEIGHT: 125 LBS | BODY MASS INDEX: 20.09 KG/M2 | TEMPERATURE: 98 F

## 2024-07-09 NOTE — OP NOTE
PATIENT:  Alex Ng      : 1995       DATE OF SURGERY:   2024          SURGEON:  Meet Carlin MD       ASSISTANT:  Tawana Aguilar NP (First Assistant)          PREOPERATIVE DIAGNOSIS:  Soft Tissue Mass - Right Face and Right Upper Extremity           POSTOPERATIVE DIAGNOSIS:  Same.           OPERATIONS:   Excision of Soft Tissue Mass Right Face (4 cm)       Excision of Soft Tissue Mass Right Upper Extremity (3 cm)          Anesthesia:  General  / Local      Estimated blood loss: Minimal (< 20)     Blood administered:  None.      Lap and instrument counts correct x 2 at the end of the case.           SPECIMEN: 1. Soft tissue Mass approximately 4 cm -  Right Face      2. Soft tissue Mass approximately 3 cm - Right Face             INDICATIONS/SIGNIFICANT HISTORY:  The patient is a 29 y.o. year old male who seen with complaints of a soft tissue mass on the Right Face and Right upper extyremity.   Risks and Benefits of surgical excision was discussed with the patient and guardians, who voiced understanding of risks and benefits and elected to proceed with surgery.           PROCEDURE IN DETAIL:  Once informed consents were obtained, the patient was taken to the operating room and placed on the operating table.  After anesthesia was induced, the marked area was prepped and draped in a standard surgical fashion.  Local anesthesia was obtained with 0.25% marcaine with epinephrine.  An incision was made around the soft tissue masses and it was dissected with blunt and sharp dissection and the mass was passed off the table as a specimen.  The wound was then irrigated and dried and bleeding stopped with cautery.  The skin was then close with suture.  The wounds were cleaned and dried and steri-strips were applied. Tawana Aguilar was present during the entirety of the case and was critical in retraction for proper dissection.  The patient tolerated the procedure well and was transported to  recovery room in good condition.

## 2024-07-10 ENCOUNTER — OFFICE VISIT (OUTPATIENT)
Dept: SURGERY | Facility: CLINIC | Age: 29
End: 2024-07-10
Payer: MEDICAID

## 2024-07-10 DIAGNOSIS — Z98.890 POST-OPERATIVE STATE: Primary | ICD-10-CM

## 2024-07-10 PROCEDURE — 1160F RVW MEDS BY RX/DR IN RCRD: CPT | Mod: CPTII,,,

## 2024-07-10 PROCEDURE — 1159F MED LIST DOCD IN RCRD: CPT | Mod: CPTII,,,

## 2024-07-10 PROCEDURE — 99024 POSTOP FOLLOW-UP VISIT: CPT | Mod: ,,,

## 2024-07-10 NOTE — PROGRESS NOTES
Post Operative Progress Note  General Surgery    Patient Name: Alex Ng  YOB: 1995    Date: 07/10/2024                   SUBJECTIVE:     Chief Complaint/Reason for Admission:   Chief Complaint   Patient presents with    Post-op Evaluation     6/17/24 Excision STM Right Face and Right Upper Arm        History of Present Illness:  Mr. Alex Ng is a 29 y.o. male who is 2 weeks post op excision of soft tissue mass.  The patient is currently without any complaints.    Review of Systems:  12 point ROS negative except as stated in HPI    PAST HISTORY:     Past Medical History:   Diagnosis Date    Autism      non verbal     Dysphagia     narrow esophagus    GERD (gastroesophageal reflux disease)     Lipoma     Seizures     Skin rash     Unspecified cataract     Unspecified cataract     right eye     Past Surgical History:   Procedure Laterality Date    COLONOSCOPY      ESOPHAGEAL DILATION      EXCISION, MASS, FACE Right 6/17/2024    Procedure: EXCISION, MASS, FACE Right;  Surgeon: Meet Carlin Jr., MD;  Location: [facility];  Service: General;  Laterality: Right;  R Face / RUE    EYE SURGERY  1994    GASTRECTOMY      SURGICAL REMOVAL OF MASS OF UPPER EXTREMITY Right 6/17/2024    Procedure: EXCISION, MASS, UPPER EXTREMITY  Right;  Surgeon: Meet Carlin Jr., MD;  Location: [facility];  Service: General;  Laterality: Right;  R Face / RUE    tubes in ears       Family History   Problem Relation Name Age of Onset    No Known Problems Mother      No Known Problems Father       Social History     Socioeconomic History    Marital status: Single   Tobacco Use    Smoking status: Never    Smokeless tobacco: Never   Substance and Sexual Activity    Alcohol use: Never    Drug use: Never    Sexual activity: Never   Social History Narrative    ** Merged History Encounter **          Social Determinants of Health     Financial Resource Strain: High Risk (6/2/2024)    Overall Financial Resource  Strain (CARDIA)     Difficulty of Paying Living Expenses: Very hard   Food Insecurity: Patient Declined (6/2/2024)    Hunger Vital Sign     Worried About Running Out of Food in the Last Year: Patient declined     Ran Out of Food in the Last Year: Patient declined   Physical Activity: Unknown (6/2/2024)    Exercise Vital Sign     Days of Exercise per Week: Patient declined     Minutes of Exercise per Session: 20 min   Recent Concern: Physical Activity - Insufficiently Active (5/16/2024)    Exercise Vital Sign     Days of Exercise per Week: 2 days     Minutes of Exercise per Session: 20 min   Stress: Patient Declined (6/2/2024)    [employer] - Occupational Stress Questionnaire     Feeling of Stress : Patient declined   Housing Stability: Unknown (6/2/2024)    Housing Stability Vital Sign     Unable to Pay for Housing in the Last Year: Patient declined     Homeless in the Last Year: Patient unable to answer       MEDICATIONS & ALLERGIES:     Current Outpatient Medications on File Prior to Visit   Medication Sig    divalproex ER (DEPAKOTE ER) 250 MG 24 hr tablet TAKE ONE TABLET BY MOUTH EVERY DAY (Patient taking differently: Take 250 mg by mouth every evening.)    divalproex ER (DEPAKOTE ER) 500 MG Tb24 24 hr tablet TAKE 1 TABLET IN THE MORNING AND 2 TABLETS AT BEDTIME    esomeprazole (NEXIUM) 40 MG capsule TAKE ONE CAPSULE BY MOUTH EVERY DAY IN THE MORNING BEFORE BREAKFAST    lacosamide (VIMPAT) 100 mg Tab TAKE ONE TABLET BY MOUTH TWICE DAILY FOR SEIZURES    Lactobacillus acidophilus 2 billion cell Tab Take 1 capsule by mouth once daily.    lamoTRIgine (LAMICTAL) 100 MG tablet TAKE ONE TABLET BY MOUTH EVERY MORNING (Patient taking differently: Daily. In am)    lamoTRIgine (LAMICTAL) 150 MG Tab TAKE ONE TABLET BY MOUTH IN THE EVENING.    mupirocin (BACTROBAN) 2 % ointment Apply topically 3 (three) times daily.    nystatin (MYCOSTATIN) powder Apply topically 2 (two) times daily. (Patient  taking differently: Apply topically as needed.)    psyllium 0.52 gram capsule Take 0.52 g by mouth as needed.    pyridoxine, vitamin B6, (B-6) 100 MG Tab Take 100 mg by mouth once daily.    risperiDONE (RISPERDAL) 1 MG tablet TAKE ONE TABLET BY MOUTH TWICE DAILY    traMADoL (ULTRAM) 50 mg tablet Take 1 tablet (50 mg total) by mouth every 6 (six) hours as needed for Pain.     No current facility-administered medications on file prior to visit.     Review of patient's allergies indicates:   Allergen Reactions    Adhesive     Dexamethasone     Gluten Diarrhea    Sulfa (sulfonamide antibiotics) Rash       OBJECTIVE:   There were no vitals taken for this visit.    Physical Exam:  General:  Well developed, well nourished, no acute distress  GI:  Abdomen soft, non-tender, non-distended, normoactive bowel sounds, no masses  Skin:  Incisions Clean/Dry/Intact    VISIT DIAGNOSES:       ICD-10-CM ICD-9-CM   1. Post-operative state  Z98.890 V45.89       ASSESSMENT/PLAN:     29 y.o. male who is s/p excision soft tissue mass from right cheek and right elbow    -  Pt doing well  -  Wounds healing well  -  Benign path    RTC PRN

## 2024-07-16 DIAGNOSIS — F84.0 AUTISTIC DISORDER: ICD-10-CM

## 2024-07-16 DIAGNOSIS — G40.909 SEIZURE DISORDER: ICD-10-CM

## 2024-07-16 RX ORDER — DIVALPROEX SODIUM 250 MG/1
TABLET, FILM COATED, EXTENDED RELEASE ORAL
Qty: 30 TABLET | Refills: 11 | Status: SHIPPED | OUTPATIENT
Start: 2024-07-16

## 2024-07-16 RX ORDER — RISPERIDONE 1 MG/1
TABLET ORAL
Qty: 60 TABLET | Refills: 11 | Status: SHIPPED | OUTPATIENT
Start: 2024-07-16

## 2024-07-16 NOTE — TELEPHONE ENCOUNTER
----- Message from Sally Singh sent at 7/16/2024  4:32 PM CDT -----  .Type:  Patient Returning Call    Who Called:Lisette with [employer]  Who Left Message for Patient:Lisette  Does the patient know what this is regarding?:Medications  Would the patient rather a call back or a response via [facility]?   Best Call Back Number:[phone]  Additional Information: Please fax this again to [facility]   Fax # [phone]  divalproex ER (DEPAKOTE ER) 500 MG Tb24 24 hr tablet   risperiDONE (RISPERDAL) 1 MG tablet

## 2024-08-14 DIAGNOSIS — K21.9 GASTROESOPHAGEAL REFLUX DISEASE, UNSPECIFIED WHETHER ESOPHAGITIS PRESENT: ICD-10-CM

## 2024-08-14 RX ORDER — ESOMEPRAZOLE MAGNESIUM 40 MG/1
CAPSULE, DELAYED RELEASE ORAL
Qty: 30 CAPSULE | Refills: 5 | Status: SHIPPED | OUTPATIENT
Start: 2024-08-14

## 2024-09-12 DIAGNOSIS — G40.909 EPILEPSY UNDETERMINED AS TO FOCAL OR GENERALIZED: ICD-10-CM

## 2024-09-12 RX ORDER — LAMOTRIGINE 100 MG/1
TABLET ORAL
Qty: 30 TABLET | Refills: 3 | Status: SHIPPED | OUTPATIENT
Start: 2024-09-12

## 2024-11-08 DIAGNOSIS — G40.909 SEIZURE DISORDER: ICD-10-CM

## 2024-11-08 DIAGNOSIS — F84.0 AUTISTIC DISORDER: ICD-10-CM

## 2024-11-11 RX ORDER — LACOSAMIDE 100 MG/1
TABLET ORAL
Qty: 60 TABLET | Refills: 3 | Status: SHIPPED | OUTPATIENT
Start: 2024-11-11

## 2024-11-11 RX ORDER — DIVALPROEX SODIUM 500 MG/1
TABLET, FILM COATED, EXTENDED RELEASE ORAL
Qty: 90 TABLET | Refills: 3 | Status: SHIPPED | OUTPATIENT
Start: 2024-11-11

## 2024-11-11 RX ORDER — LAMOTRIGINE 150 MG/1
TABLET ORAL
Qty: 30 TABLET | Refills: 6 | Status: SHIPPED | OUTPATIENT
Start: 2024-11-11

## 2024-11-12 ENCOUNTER — TELEPHONE (OUTPATIENT)
Dept: INTERNAL MEDICINE | Facility: CLINIC | Age: 29
End: 2024-11-12
Payer: MEDICAID

## 2024-11-12 NOTE — TELEPHONE ENCOUNTER
----- Message from Nurse Nhi sent at 11/12/2024  2:33 PM CST -----  Regarding: mee MCCLURE 11/20 @1:00  Fasting labs needed.

## 2024-11-18 ENCOUNTER — LAB VISIT (OUTPATIENT)
Dept: LAB | Facility: HOSPITAL | Age: 29
End: 2024-11-18
Attending: INTERNAL MEDICINE
Payer: MEDICAID

## 2024-11-18 DIAGNOSIS — F84.0 AUTISM: ICD-10-CM

## 2024-11-18 DIAGNOSIS — D64.9 ANEMIA, UNSPECIFIED TYPE: ICD-10-CM

## 2024-11-18 DIAGNOSIS — D69.6 THROMBOCYTOPENIA: ICD-10-CM

## 2024-11-18 DIAGNOSIS — G40.909 SEIZURE DISORDER: ICD-10-CM

## 2024-11-18 DIAGNOSIS — Z00.00 WELLNESS EXAMINATION: ICD-10-CM

## 2024-11-18 DIAGNOSIS — R22.9 SUBCUTANEOUS NODULES: ICD-10-CM

## 2024-11-18 LAB
ALBUMIN SERPL-MCNC: 3 G/DL (ref 3.5–5)
ALBUMIN/GLOB SERPL: 0.8 RATIO (ref 1.1–2)
ALP SERPL-CCNC: 72 UNIT/L (ref 40–150)
ALT SERPL-CCNC: 22 UNIT/L (ref 0–55)
ANION GAP SERPL CALC-SCNC: 8 MEQ/L
AST SERPL-CCNC: 28 UNIT/L (ref 5–34)
BASOPHILS # BLD AUTO: 0.02 X10(3)/MCL
BASOPHILS NFR BLD AUTO: 0.5 %
BILIRUB SERPL-MCNC: 0.3 MG/DL
BUN SERPL-MCNC: 14.3 MG/DL (ref 8.9–20.6)
CALCIUM SERPL-MCNC: 9.2 MG/DL (ref 8.4–10.2)
CHLORIDE SERPL-SCNC: 105 MMOL/L (ref 98–107)
CHOLEST SERPL-MCNC: 142 MG/DL
CHOLEST/HDLC SERPL: 2 {RATIO} (ref 0–5)
CO2 SERPL-SCNC: 26 MMOL/L (ref 22–29)
CREAT SERPL-MCNC: 0.77 MG/DL (ref 0.72–1.25)
CREAT/UREA NIT SERPL: 19
EOSINOPHIL # BLD AUTO: 0.27 X10(3)/MCL (ref 0–0.9)
EOSINOPHIL NFR BLD AUTO: 6.3 %
ERYTHROCYTE [DISTWIDTH] IN BLOOD BY AUTOMATED COUNT: 16.7 % (ref 11.5–17)
GFR SERPLBLD CREATININE-BSD FMLA CKD-EPI: >60 ML/MIN/1.73/M2
GLOBULIN SER-MCNC: 3.6 GM/DL (ref 2.4–3.5)
GLUCOSE SERPL-MCNC: 76 MG/DL (ref 74–100)
HCT VFR BLD AUTO: 39.8 % (ref 42–52)
HDLC SERPL-MCNC: 58 MG/DL (ref 35–60)
HGB BLD-MCNC: 13 G/DL (ref 14–18)
IMM GRANULOCYTES # BLD AUTO: 0.01 X10(3)/MCL (ref 0–0.04)
IMM GRANULOCYTES NFR BLD AUTO: 0.2 %
LDLC SERPL CALC-MCNC: 73 MG/DL (ref 50–140)
LYMPHOCYTES # BLD AUTO: 2.19 X10(3)/MCL (ref 0.6–4.6)
LYMPHOCYTES NFR BLD AUTO: 51 %
MCH RBC QN AUTO: 28.8 PG (ref 27–31)
MCHC RBC AUTO-ENTMCNC: 32.7 G/DL (ref 33–36)
MCV RBC AUTO: 88.1 FL (ref 80–94)
MONOCYTES # BLD AUTO: 0.28 X10(3)/MCL (ref 0.1–1.3)
MONOCYTES NFR BLD AUTO: 6.5 %
NEUTROPHILS # BLD AUTO: 1.52 X10(3)/MCL (ref 2.1–9.2)
NEUTROPHILS NFR BLD AUTO: 35.5 %
NRBC BLD AUTO-RTO: 0 %
PLATELET # BLD AUTO: 124 X10(3)/MCL (ref 130–400)
PMV BLD AUTO: 11 FL (ref 7.4–10.4)
POTASSIUM SERPL-SCNC: 4 MMOL/L (ref 3.5–5.1)
PROT SERPL-MCNC: 6.6 GM/DL (ref 6.4–8.3)
RBC # BLD AUTO: 4.52 X10(6)/MCL (ref 4.7–6.1)
SODIUM SERPL-SCNC: 139 MMOL/L (ref 136–145)
TRIGL SERPL-MCNC: 55 MG/DL (ref 34–140)
TSH SERPL-ACNC: 2.41 UIU/ML (ref 0.35–4.94)
VLDLC SERPL CALC-MCNC: 11 MG/DL
WBC # BLD AUTO: 4.29 X10(3)/MCL (ref 4.5–11.5)

## 2024-11-18 PROCEDURE — 85025 COMPLETE CBC W/AUTO DIFF WBC: CPT

## 2024-11-18 PROCEDURE — 80053 COMPREHEN METABOLIC PANEL: CPT

## 2024-11-18 PROCEDURE — 84443 ASSAY THYROID STIM HORMONE: CPT

## 2024-11-18 PROCEDURE — 80061 LIPID PANEL: CPT

## 2024-11-18 PROCEDURE — 36415 COLL VENOUS BLD VENIPUNCTURE: CPT

## 2024-11-20 ENCOUNTER — OFFICE VISIT (OUTPATIENT)
Dept: INTERNAL MEDICINE | Facility: CLINIC | Age: 29
End: 2024-11-20
Payer: MEDICAID

## 2024-11-20 VITALS
DIASTOLIC BLOOD PRESSURE: 65 MMHG | WEIGHT: 125 LBS | SYSTOLIC BLOOD PRESSURE: 111 MMHG | RESPIRATION RATE: 20 BRPM | HEIGHT: 66 IN | BODY MASS INDEX: 20.09 KG/M2

## 2024-11-20 DIAGNOSIS — Z23 FLU VACCINE NEED: Primary | ICD-10-CM

## 2024-11-20 DIAGNOSIS — D64.9 ANEMIA, UNSPECIFIED TYPE: ICD-10-CM

## 2024-11-20 DIAGNOSIS — G40.909 SEIZURE DISORDER: Primary | ICD-10-CM

## 2024-11-20 DIAGNOSIS — L98.9 SKIN LESION: ICD-10-CM

## 2024-11-20 DIAGNOSIS — K21.9 GASTROESOPHAGEAL REFLUX DISEASE, UNSPECIFIED WHETHER ESOPHAGITIS PRESENT: ICD-10-CM

## 2024-11-20 DIAGNOSIS — F84.0 AUTISTIC DISORDER: ICD-10-CM

## 2024-11-20 NOTE — PROGRESS NOTES
Patient ID: [id number]      Subjective:     Chief Complaint: Follow-up      Alex Ng is a 29 y.o. male.  Is a 29-year-old man in for follow-up of multiple problems.  Overall he has been doing fairly well except for a mild skin breakdown on the right hip area.  Also some mild generalized itching and dryness of the skin.    He had the benign cysts removed.  Turned out to be epidermoid inclusion cyst.    Follow-up        Review of Systems    Outpatient Medications Marked as Taking for the 11/20/24 encounter (Office Visit) with Alex Mccabe MD   Medication Sig Dispense Refill    divalproex ER (DEPAKOTE ER) 250 MG 24 hr tablet TAKE ONE TABLET BY MOUTH EVERY DAY 30 tablet 11    divalproex ER (DEPAKOTE ER) 500 MG Tb24 24 hr tablet TAKE 1 TABLET IN THE MORNING AND 2 TABLETS AT BEDTIME 90 tablet 3    esomeprazole (NEXIUM) 40 MG capsule TAKE ONE CAPSULE BY MOUTH EVERY DAY IN THE MORNING BEFORE BREAKFAST 30 capsule 5    lacosamide (VIMPAT) 100 mg Tab TAKE ONE TABLET BY MOUTH TWICE DAILY FOR SEIZURES 60 tablet 3    Lactobacillus acidophilus 2 billion cell Tab Take 1 capsule by mouth once daily.      lamoTRIgine (LAMICTAL) 100 MG tablet TAKE ONE TABLET BY MOUTH EVERY MORNING 30 tablet 3    lamoTRIgine (LAMICTAL) 150 MG Tab TAKE ONE TABLET BY MOUTH IN THE EVENING. 30 tablet 6    mupirocin (BACTROBAN) 2 % ointment Apply topically 3 (three) times daily. 15 g 5    nystatin (MYCOSTATIN) powder Apply topically 2 (two) times daily. (Patient taking differently: Apply topically as needed.) 30 g 11    psyllium 0.52 gram capsule Take 0.52 g by mouth as needed.      pyridoxine, vitamin B6, (B-6) 100 MG Tab Take 100 mg by mouth once daily.      risperiDONE (RISPERDAL) 1 MG tablet TAKE ONE TABLET BY MOUTH TWICE DAILY 60 tablet 11    traMADoL (ULTRAM) 50 mg tablet Take 1 tablet (50 mg total) by mouth every 6 (six) hours as needed for Pain. 20 tablet 0     Current Facility-Administered Medications for the 11/20/24 encounter  "(Office Visit) with Alex Mccabe MD   Medication Dose Route Frequency Provider Last Rate Last Admin    [COMPLETED] influenza (Flulaval, Fluzone, Fluarix) 45 mcg/0.5 mL IM vaccine (> or = 6 mo) 0.5 mL  0.5 mL Intramuscular 1 time in Clinic/HOD Alex Mccabe MD   0.5 mL at 11/20/24 1311       Objective:     /65 (BP Location: Right arm, Patient Position: Sitting)   Resp 20   Ht 5' 6" (1.676 m)   Wt 56.7 kg (125 lb)   BMI 20.18 kg/m²     Physical Exam  Vitals reviewed.   Constitutional:       Appearance: Normal appearance.   HENT:      Head: Normocephalic and atraumatic.      Right Ear: Tympanic membrane normal.      Left Ear: Tympanic membrane normal.      Mouth/Throat:      Pharynx: Oropharynx is clear.   Eyes:      Pupils: Pupils are equal, round, and reactive to light.   Neck:      Vascular: No carotid bruit.   Cardiovascular:      Rate and Rhythm: Normal rate and regular rhythm.      Pulses: Normal pulses.      Heart sounds: Normal heart sounds.   Pulmonary:      Effort: Pulmonary effort is normal.      Breath sounds: Normal breath sounds.   Abdominal:      General: Abdomen is flat.      Palpations: Abdomen is soft. There is no mass.      Tenderness: There is no abdominal tenderness. There is no guarding.   Musculoskeletal:         General: No swelling.      Cervical back: Neck supple.   Lymphadenopathy:      Cervical: No cervical adenopathy.   Skin:     General: Skin is warm and dry.      Comments: Area of redness about 2 cm in diameter on the right hip area.  Over the greater trochanteric area.  Small clear to whitish center spot.  No drainage.   Neurological:      General: No focal deficit present.      Mental Status: He is alert and oriented to person, place, and time.       Lab work reviewed  Assessment:   1. Seizure disorder.  Stable     2. Autism.  Stable     3. Mild chronic anemia.  Stable     4. Pressure injury right hip area.  Stage I and small.    5. Status post removal " epidermoid inclusion cysts        Plan:   Continue current meds.  Follow-up 6 months with CBC CMP prior.  Problem List Items Addressed This Visit          Neuro    Autistic disorder    Relevant Orders    CBC Auto Differential    Comprehensive Metabolic Panel    Seizure disorder - Primary    Overview     LAST SEIZURE 3/13/12         Relevant Orders    CBC Auto Differential    Comprehensive Metabolic Panel       Derm    Skin lesion    Relevant Orders    CBC Auto Differential    Comprehensive Metabolic Panel       Oncology    Anemia    Relevant Orders    CBC Auto Differential    Comprehensive Metabolic Panel     Other Visit Diagnoses       Gastroesophageal reflux disease, unspecified whether esophagitis present        Relevant Orders    CBC Auto Differential    Comprehensive Metabolic Panel             Orders Placed This Encounter   Procedures    CBC Auto Differential     Standing Status:   Future     Standing Expiration Date:   11/20/2025    Comprehensive Metabolic Panel     Standing Status:   Future     Standing Expiration Date:   11/20/2025        Medication List with Changes/Refills   Current Medications    DIVALPROEX ER (DEPAKOTE ER) 250 MG 24 HR TABLET    TAKE ONE TABLET BY MOUTH EVERY DAY       Start Date: 7/16/2024 End Date: --    DIVALPROEX ER (DEPAKOTE ER) 500 MG TB24 24 HR TABLET    TAKE 1 TABLET IN THE MORNING AND 2 TABLETS AT BEDTIME       Start Date: 11/11/2024End Date: --    ESOMEPRAZOLE (NEXIUM) 40 MG CAPSULE    TAKE ONE CAPSULE BY MOUTH EVERY DAY IN THE MORNING BEFORE BREAKFAST       Start Date: 8/14/2024 End Date: --    LACOSAMIDE (VIMPAT) 100 MG TAB    TAKE ONE TABLET BY MOUTH TWICE DAILY FOR SEIZURES       Start Date: 11/11/2024End Date: --    LACTOBACILLUS ACIDOPHILUS 2 BILLION CELL TAB    Take 1 capsule by mouth once daily.       Start Date: --        End Date: --    LAMOTRIGINE (LAMICTAL) 100 MG TABLET    TAKE ONE TABLET BY MOUTH EVERY MORNING       Start Date: 9/12/2024 End Date: --     LAMOTRIGINE (LAMICTAL) 150 MG TAB    TAKE ONE TABLET BY MOUTH IN THE EVENING.       Start Date: 11/11/2024End Date: --    MUPIROCIN (BACTROBAN) 2 % OINTMENT    Apply topically 3 (three) times daily.       Start Date: 5/2/2022  End Date: --    NYSTATIN (MYCOSTATIN) POWDER    Apply topically 2 (two) times daily.       Start Date: 5/2/2023  End Date: --    PSYLLIUM 0.52 GRAM CAPSULE    Take 0.52 g by mouth as needed.       Start Date: --        End Date: --    PYRIDOXINE, VITAMIN B6, (B-6) 100 MG TAB    Take 100 mg by mouth once daily.       Start Date: --        End Date: --    RISPERIDONE (RISPERDAL) 1 MG TABLET    TAKE ONE TABLET BY MOUTH TWICE DAILY       Start Date: 7/16/2024 End Date: --    TRAMADOL (ULTRAM) 50 MG TABLET    Take 1 tablet (50 mg total) by mouth every 6 (six) hours as needed for Pain.       Start Date: 6/17/2024 End Date: --            Follow up in about 6 months (around 5/20/2025). In addition to their scheduled follow up, the patient has also been instructed to follow up on as needed basis.       Alex Mccabe

## 2024-12-05 ENCOUNTER — TELEPHONE (OUTPATIENT)
Dept: INTERNAL MEDICINE | Facility: CLINIC | Age: 29
End: 2024-12-05
Payer: MEDICAID

## 2024-12-05 NOTE — TELEPHONE ENCOUNTER
----- Message from [employer] sent at 12/5/2024  3:37 PM CST -----  .Type:  Patient Returning Call    Who Called:Andie  Who Left Message for Patient:Mother  Does the patient know what this is regarding?:May have crohn's not sure   Would the patient rather a call back or a response via [facility]?   Best Call Back Number:[phone]  Additional Information: Please call back need some help about mass diarrhea with pieces of corn in poop. He has gluten sensitivity

## 2024-12-05 NOTE — TELEPHONE ENCOUNTER
Mother stated diarrhea is intermittent, patient had black stool but was given pepto previous to BM.  No blood in stool.  Patient is non verbal and does not complain.  First episode of diarrhea was Sunday then again Yesterday.  Mother said he is not having diarrhea with every BM.  Mother asking if she should be concerned.  Mother was advised to keep an eye out for bloody BM.  Please advise.

## 2025-01-14 DIAGNOSIS — F84.0 AUTISTIC DISORDER: ICD-10-CM

## 2025-01-14 DIAGNOSIS — G40.909 SEIZURE DISORDER: ICD-10-CM

## 2025-01-14 RX ORDER — RISPERIDONE 1 MG/1
TABLET ORAL
Qty: 60 TABLET | Refills: 11 | Status: SHIPPED | OUTPATIENT
Start: 2025-01-14

## 2025-01-14 RX ORDER — DIVALPROEX SODIUM 250 MG/1
TABLET, FILM COATED, EXTENDED RELEASE ORAL
Qty: 30 TABLET | Refills: 11 | Status: SHIPPED | OUTPATIENT
Start: 2025-01-14

## 2025-02-12 DIAGNOSIS — K21.9 GASTROESOPHAGEAL REFLUX DISEASE, UNSPECIFIED WHETHER ESOPHAGITIS PRESENT: ICD-10-CM

## 2025-02-12 DIAGNOSIS — G40.909 EPILEPSY UNDETERMINED AS TO FOCAL OR GENERALIZED: ICD-10-CM

## 2025-02-12 RX ORDER — LAMOTRIGINE 100 MG/1
TABLET ORAL
Qty: 30 TABLET | Refills: 3 | Status: SHIPPED | OUTPATIENT
Start: 2025-02-12

## 2025-02-12 RX ORDER — ESOMEPRAZOLE MAGNESIUM 40 MG/1
CAPSULE, DELAYED RELEASE ORAL
Qty: 30 CAPSULE | Refills: 5 | Status: SHIPPED | OUTPATIENT
Start: 2025-02-12

## 2025-03-14 DIAGNOSIS — F84.0 AUTISTIC DISORDER: ICD-10-CM

## 2025-03-14 DIAGNOSIS — G40.909 SEIZURE DISORDER: ICD-10-CM

## 2025-03-14 RX ORDER — DIVALPROEX SODIUM 500 MG/1
TABLET, FILM COATED, EXTENDED RELEASE ORAL
Qty: 90 TABLET | Refills: 3 | Status: SHIPPED | OUTPATIENT
Start: 2025-03-14

## 2025-03-14 RX ORDER — LACOSAMIDE 100 MG/1
TABLET ORAL
Qty: 60 TABLET | Refills: 3 | Status: SHIPPED | OUTPATIENT
Start: 2025-03-14

## 2025-03-14 NOTE — TELEPHONE ENCOUNTER
Copied from CRM #[id number]. Topic: Medications - Medication Refill  >> Mar 14, 2025 11:00 AM Sally wrote:  .Type:  RX Refill Request    Who Called: Ling   Refill or New Rx:refill   RX Name and Strength:lacosamide (VIMPAT) 100 mg Tab  How is the patient currently taking it? (ex. 1XDay):2/day  Is this a 30 day or 90 day RX:30  Preferred Pharmacy with phone number:[facility] or Mail Order:Local  Ordering Provider:Eliot   Would the patient rather a call back or a response via [facility]?    Best Call Back Number:[phone]  Additional Information: lacosamide (VIMPAT) 100 mg Tab  .Type:  RX Refill Request    Who Called: Ling at [facility]  Refill or New Rx:refill   RX Name and Strength:divalproex ER (DEPAKOTE ER) 500 MG Tb24 24 hr tablet  How is the patient currently taking it? (ex. 1XDay):3/day  Is this a 30 day or 90 day RX:30  Preferred Pharmacy with phone number:[facility] or Mail Order:Local  Ordering Provider:Eliot   Would the patient rather a call back or a response via [facility]?    Best Call Back Number:[phone]  Additional Information: divalproex ER (DEPAKOTE ER) 500 MG Tb24 24 hr tablet

## 2025-05-07 DIAGNOSIS — G40.909 EPILEPSY UNDETERMINED AS TO FOCAL OR GENERALIZED: ICD-10-CM

## 2025-05-07 DIAGNOSIS — G40.909 SEIZURE DISORDER: ICD-10-CM

## 2025-05-07 RX ORDER — LAMOTRIGINE 150 MG/1
150 TABLET ORAL NIGHTLY
Qty: 30 TABLET | Refills: 7 | Status: SHIPPED | OUTPATIENT
Start: 2025-05-07

## 2025-05-07 RX ORDER — LAMOTRIGINE 100 MG/1
100 TABLET ORAL EVERY MORNING
Qty: 30 TABLET | Refills: 4 | Status: SHIPPED | OUTPATIENT
Start: 2025-05-07

## 2025-05-16 ENCOUNTER — TELEPHONE (OUTPATIENT)
Dept: INTERNAL MEDICINE | Facility: CLINIC | Age: 30
End: 2025-05-16
Payer: MEDICAID

## 2025-05-16 NOTE — TELEPHONE ENCOUNTER
----- Message from Nurse Nhi sent at 5/14/2025  2:30 PM CDT -----  Regarding: mee Israel 05/21 @1:40  Non fasting labs needed.

## 2025-05-19 ENCOUNTER — LAB VISIT (OUTPATIENT)
Dept: LAB | Facility: HOSPITAL | Age: 30
End: 2025-05-19
Attending: INTERNAL MEDICINE
Payer: MEDICAID

## 2025-05-19 DIAGNOSIS — F84.0 AUTISTIC DISORDER: ICD-10-CM

## 2025-05-19 DIAGNOSIS — D64.9 ANEMIA, UNSPECIFIED TYPE: ICD-10-CM

## 2025-05-19 DIAGNOSIS — G40.909 SEIZURE DISORDER: ICD-10-CM

## 2025-05-19 DIAGNOSIS — L98.9 SKIN LESION: ICD-10-CM

## 2025-05-19 DIAGNOSIS — R19.7 DIARRHEA OF PRESUMED INFECTIOUS ORIGIN: Primary | ICD-10-CM

## 2025-05-19 DIAGNOSIS — K21.9 GASTROESOPHAGEAL REFLUX DISEASE, UNSPECIFIED WHETHER ESOPHAGITIS PRESENT: ICD-10-CM

## 2025-05-19 DIAGNOSIS — K29.50 CHRONIC ANTRAL GASTRITIS: ICD-10-CM

## 2025-05-19 LAB
ALBUMIN SERPL-MCNC: 3.1 G/DL (ref 3.5–5)
ALBUMIN/GLOB SERPL: 0.9 RATIO (ref 1.1–2)
ALP SERPL-CCNC: 66 UNIT/L (ref 40–150)
ALT SERPL-CCNC: 26 UNIT/L (ref 0–55)
ANION GAP SERPL CALC-SCNC: 7 MEQ/L
AST SERPL-CCNC: 31 UNIT/L (ref 11–45)
BASOPHILS # BLD AUTO: 0.02 X10(3)/MCL
BASOPHILS NFR BLD AUTO: 0.6 %
BILIRUB SERPL-MCNC: 0.3 MG/DL
BUN SERPL-MCNC: 16.6 MG/DL (ref 8.9–20.6)
CALCIUM SERPL-MCNC: 9.1 MG/DL (ref 8.4–10.2)
CHLORIDE SERPL-SCNC: 107 MMOL/L (ref 98–107)
CO2 SERPL-SCNC: 26 MMOL/L (ref 22–29)
CREAT SERPL-MCNC: 0.76 MG/DL (ref 0.72–1.25)
CREAT/UREA NIT SERPL: 22
CRP SERPL-MCNC: <1 MG/L
EOSINOPHIL # BLD AUTO: 0.18 X10(3)/MCL (ref 0–0.9)
EOSINOPHIL NFR BLD AUTO: 5.2 %
ERYTHROCYTE [DISTWIDTH] IN BLOOD BY AUTOMATED COUNT: 17.7 % (ref 11.5–17)
ERYTHROCYTE [SEDIMENTATION RATE] IN BLOOD: <1 MM/HR (ref 0–15)
GFR SERPLBLD CREATININE-BSD FMLA CKD-EPI: >60 ML/MIN/1.73/M2
GLOBULIN SER-MCNC: 3.6 GM/DL (ref 2.4–3.5)
GLUCOSE SERPL-MCNC: 74 MG/DL (ref 74–100)
HCT VFR BLD AUTO: 43.6 % (ref 42–52)
HGB BLD-MCNC: 13.5 G/DL (ref 14–18)
IMM GRANULOCYTES # BLD AUTO: 0 X10(3)/MCL (ref 0–0.04)
IMM GRANULOCYTES NFR BLD AUTO: 0 %
LYMPHOCYTES # BLD AUTO: 1.99 X10(3)/MCL (ref 0.6–4.6)
LYMPHOCYTES NFR BLD AUTO: 57 %
MCH RBC QN AUTO: 28.1 PG (ref 27–31)
MCHC RBC AUTO-ENTMCNC: 31 G/DL (ref 33–36)
MCV RBC AUTO: 90.6 FL (ref 80–94)
MONOCYTES # BLD AUTO: 0.26 X10(3)/MCL (ref 0.1–1.3)
MONOCYTES NFR BLD AUTO: 7.4 %
NEUTROPHILS # BLD AUTO: 1.04 X10(3)/MCL (ref 2.1–9.2)
NEUTROPHILS NFR BLD AUTO: 29.8 %
NRBC BLD AUTO-RTO: 0 %
PLATELET # BLD AUTO: 133 X10(3)/MCL (ref 130–400)
PMV BLD AUTO: 11.3 FL (ref 7.4–10.4)
POTASSIUM SERPL-SCNC: 4.1 MMOL/L (ref 3.5–5.1)
PROT SERPL-MCNC: 6.7 GM/DL (ref 6.4–8.3)
RBC # BLD AUTO: 4.81 X10(6)/MCL (ref 4.7–6.1)
SODIUM SERPL-SCNC: 140 MMOL/L (ref 136–145)
TSH SERPL-ACNC: 3.39 UIU/ML (ref 0.35–4.94)
WBC # BLD AUTO: 3.49 X10(3)/MCL (ref 4.5–11.5)

## 2025-05-19 PROCEDURE — 85025 COMPLETE CBC W/AUTO DIFF WBC: CPT

## 2025-05-19 PROCEDURE — 86140 C-REACTIVE PROTEIN: CPT

## 2025-05-19 PROCEDURE — 84443 ASSAY THYROID STIM HORMONE: CPT

## 2025-05-19 PROCEDURE — 80053 COMPREHEN METABOLIC PANEL: CPT

## 2025-05-19 PROCEDURE — 85652 RBC SED RATE AUTOMATED: CPT

## 2025-05-19 PROCEDURE — 36415 COLL VENOUS BLD VENIPUNCTURE: CPT

## 2025-05-21 ENCOUNTER — OFFICE VISIT (OUTPATIENT)
Dept: INTERNAL MEDICINE | Facility: CLINIC | Age: 30
End: 2025-05-21
Payer: MEDICAID

## 2025-05-21 VITALS
RESPIRATION RATE: 20 BRPM | HEART RATE: 106 BPM | HEIGHT: 66 IN | OXYGEN SATURATION: 97 % | BODY MASS INDEX: 20.18 KG/M2 | DIASTOLIC BLOOD PRESSURE: 63 MMHG | SYSTOLIC BLOOD PRESSURE: 119 MMHG

## 2025-05-21 DIAGNOSIS — K21.9 GASTROESOPHAGEAL REFLUX DISEASE, UNSPECIFIED WHETHER ESOPHAGITIS PRESENT: ICD-10-CM

## 2025-05-21 DIAGNOSIS — G40.909 EPILEPSY UNDETERMINED AS TO FOCAL OR GENERALIZED: ICD-10-CM

## 2025-05-21 DIAGNOSIS — R19.7 DIARRHEA, UNSPECIFIED TYPE: Primary | ICD-10-CM

## 2025-05-21 DIAGNOSIS — D64.9 ANEMIA, UNSPECIFIED TYPE: ICD-10-CM

## 2025-05-21 DIAGNOSIS — F84.0 AUTISTIC DISORDER: ICD-10-CM

## 2025-05-21 PROCEDURE — 99214 OFFICE O/P EST MOD 30 MIN: CPT | Mod: ,,, | Performed by: INTERNAL MEDICINE

## 2025-05-21 NOTE — PROGRESS NOTES
Patient ID: [id number]      Subjective:     Chief Complaint: Annual Exam      Alex Ng is a 30 y.o. male.  The patient is a 30-year-old man in for follow-up of numerous medical problems.  Overall he has been doing well.  Skin problems are stable.  He has developed a couple of nodules in the subcutaneous skin, 1 under an ear in another on the arm.  Not bothering him.  He has a history of benign cyst removed in the past.    He has had issues with loose stools.  His gastroenterologist recommended Imodium PRN.  The patient does consume about a gal of milk per week.  He is on a gluten free diet.  No artificial sweeteners.    Otherwise doing well        Review of Systems    Outpatient Medications Marked as Taking for the 5/21/25 encounter (Office Visit) with Alex Mccabe MD   Medication Sig Dispense Refill    divalproex ER (DEPAKOTE ER) 250 MG 24 hr tablet TAKE ONE TABLET BY MOUTH EVERY DAY 30 tablet 11    divalproex ER (DEPAKOTE ER) 500 MG Tb24 24 hr tablet TAKE 1 TABLET IN THE MORNING AND 2 TABLETS AT BEDTIME 90 tablet 3    esomeprazole (NEXIUM) 40 MG capsule TAKE ONE CAPSULE BY MOUTH EVERY DAY IN THE MORNING BEFORE BREAKFAST 30 capsule 5    lacosamide (VIMPAT) 100 mg Tab TAKE ONE TABLET BY MOUTH TWICE DAILY FOR SEIZURES 60 tablet 3    Lactobacillus acidophilus 2 billion cell Tab Take 1 capsule by mouth once daily.      lamoTRIgine (LAMICTAL) 100 MG tablet TAKE ONE TABLET BY MOUTH EVERY MORNING 30 tablet 4    lamoTRIgine (LAMICTAL) 150 MG Tab TAKE ONE TABLET BY MOUTH IN THE EVENING. 30 tablet 7    mupirocin (BACTROBAN) 2 % ointment Apply topically 3 (three) times daily. 15 g 5    nystatin (MYCOSTATIN) powder Apply topically 2 (two) times daily. (Patient taking differently: Apply topically as needed.) 30 g 11    psyllium 0.52 gram capsule Take 0.52 g by mouth as needed.      pyridoxine, vitamin B6, (B-6) 100 MG Tab Take 100 mg by mouth once daily.      risperiDONE (RISPERDAL) 1 MG tablet TAKE ONE TABLET  "BY MOUTH TWICE DAILY 60 tablet 11    [DISCONTINUED] traMADoL (ULTRAM) 50 mg tablet Take 1 tablet (50 mg total) by mouth every 6 (six) hours as needed for Pain. 20 tablet 0       Objective:     /63 (BP Location: Right arm, Patient Position: Sitting)   Pulse 106   Resp 20   Ht 5' 6" (1.676 m)   SpO2 97%   BMI 20.18 kg/m²     Physical Exam  Vitals reviewed.   Constitutional:       Appearance: Normal appearance.   HENT:      Head: Normocephalic and atraumatic.      Right Ear: Tympanic membrane normal.      Left Ear: Tympanic membrane normal.      Mouth/Throat:      Pharynx: Oropharynx is clear.   Eyes:      Pupils: Pupils are equal, round, and reactive to light.   Neck:      Vascular: No carotid bruit.   Cardiovascular:      Rate and Rhythm: Normal rate and regular rhythm.      Pulses: Normal pulses.      Heart sounds: Normal heart sounds.   Pulmonary:      Effort: Pulmonary effort is normal.      Breath sounds: Normal breath sounds.   Abdominal:      General: Abdomen is flat.      Palpations: Abdomen is soft. There is no mass.      Tenderness: There is no abdominal tenderness. There is no guarding.   Musculoskeletal:         General: No swelling.      Cervical back: Neck supple.   Lymphadenopathy:      Cervical: No cervical adenopathy.   Skin:     General: Skin is warm and dry.   Neurological:      General: No focal deficit present.      Mental Status: He is alert and oriented to person, place, and time.     Lab work reviewed    Assessment:     1. Increasingly frequent soft stools.  Consider lactose intolerance.    2. Seizure disorder.  Control     3. Autism.    4. History of GERD     5. History of anemia.  Corrected    Plan:   Suggest trying lactose-free diet and see what happens.  It has been improves the situation then milk can be resumed in smaller amounts.  Okay for yogurt, Lactaid milk, soy or almond milk, ensure supplements etc..  Would recommend PRN Pepto-Bismol instead of Imodium.  Also consider use " of fiber supplements such as Metamucil.    Otherwise follow-up with me 6 months with CBC CMP lipid TSH prior  Problem List Items Addressed This Visit          Neuro    Epilepsy undetermined as to focal or generalized    Relevant Orders    CBC Auto Differential    Comprehensive Metabolic Panel    Lipid Panel    TSH    Autistic disorder    Relevant Orders    CBC Auto Differential    Comprehensive Metabolic Panel    Lipid Panel    TSH       Oncology    Anemia    Relevant Orders    CBC Auto Differential    Comprehensive Metabolic Panel    Lipid Panel    TSH     Other Visit Diagnoses         Diarrhea, unspecified type    -  Primary    Relevant Orders    CBC Auto Differential    Comprehensive Metabolic Panel    Lipid Panel    TSH      Gastroesophageal reflux disease, unspecified whether esophagitis present        Relevant Orders    CBC Auto Differential    Comprehensive Metabolic Panel    Lipid Panel    TSH             Orders Placed This Encounter   Procedures    CBC Auto Differential     Standing Status:   Future     Expected Date:   11/21/2025     Expiration Date:   5/21/2026    Comprehensive Metabolic Panel     Standing Status:   Future     Expected Date:   11/21/2025     Expiration Date:   5/21/2026    Lipid Panel     Standing Status:   Future     Expected Date:   11/21/2025     Expiration Date:   5/21/2026    TSH     Standing Status:   Future     Expected Date:   11/21/2025     Expiration Date:   5/21/2026        Medication List with Changes/Refills   Current Medications    DIVALPROEX ER (DEPAKOTE ER) 250 MG 24 HR TABLET    TAKE ONE TABLET BY MOUTH EVERY DAY       Start Date: 1/14/2025 End Date: --    DIVALPROEX ER (DEPAKOTE ER) 500 MG TB24 24 HR TABLET    TAKE 1 TABLET IN THE MORNING AND 2 TABLETS AT BEDTIME       Start Date: 3/14/2025 End Date: --    ESOMEPRAZOLE (NEXIUM) 40 MG CAPSULE    TAKE ONE CAPSULE BY MOUTH EVERY DAY IN THE MORNING BEFORE BREAKFAST       Start Date: 2/12/2025 End Date: --    LACOSAMIDE  (VIMPAT) 100 MG TAB    TAKE ONE TABLET BY MOUTH TWICE DAILY FOR SEIZURES       Start Date: 3/14/2025 End Date: --    LACTOBACILLUS ACIDOPHILUS 2 BILLION CELL TAB    Take 1 capsule by mouth once daily.       Start Date: --        End Date: --    LAMOTRIGINE (LAMICTAL) 100 MG TABLET    TAKE ONE TABLET BY MOUTH EVERY MORNING       Start Date: 5/7/2025  End Date: --    LAMOTRIGINE (LAMICTAL) 150 MG TAB    TAKE ONE TABLET BY MOUTH IN THE EVENING.       Start Date: 5/7/2025  End Date: --    MUPIROCIN (BACTROBAN) 2 % OINTMENT    Apply topically 3 (three) times daily.       Start Date: 5/2/2022  End Date: --    NYSTATIN (MYCOSTATIN) POWDER    Apply topically 2 (two) times daily.       Start Date: 5/2/2023  End Date: --    PSYLLIUM 0.52 GRAM CAPSULE    Take 0.52 g by mouth as needed.       Start Date: --        End Date: --    PYRIDOXINE, VITAMIN B6, (B-6) 100 MG TAB    Take 100 mg by mouth once daily.       Start Date: --        End Date: --    RISPERIDONE (RISPERDAL) 1 MG TABLET    TAKE ONE TABLET BY MOUTH TWICE DAILY       Start Date: 1/14/2025 End Date: --   Discontinued Medications    TRAMADOL (ULTRAM) 50 MG TABLET    Take 1 tablet (50 mg total) by mouth every 6 (six) hours as needed for Pain.       Start Date: 6/17/2024 End Date: 5/21/2025            No follow-ups on file. In addition to their scheduled follow up, the patient has also been instructed to follow up on as needed basis.       Alex Mccabe

## 2025-07-08 DIAGNOSIS — F84.0 AUTISTIC DISORDER: ICD-10-CM

## 2025-07-08 DIAGNOSIS — G40.909 SEIZURE DISORDER: ICD-10-CM

## 2025-07-08 RX ORDER — DIVALPROEX SODIUM 500 MG/1
TABLET, FILM COATED, EXTENDED RELEASE ORAL
Qty: 90 TABLET | Refills: 3 | Status: SHIPPED | OUTPATIENT
Start: 2025-07-08

## 2025-08-05 ENCOUNTER — TELEPHONE (OUTPATIENT)
Dept: INTERNAL MEDICINE | Facility: CLINIC | Age: 30
End: 2025-08-05
Payer: MEDICAID

## 2025-08-12 DIAGNOSIS — F84.0 AUTISTIC DISORDER: ICD-10-CM

## 2025-08-12 DIAGNOSIS — K21.9 GASTROESOPHAGEAL REFLUX DISEASE, UNSPECIFIED WHETHER ESOPHAGITIS PRESENT: ICD-10-CM

## 2025-08-12 DIAGNOSIS — G40.909 SEIZURE DISORDER: ICD-10-CM

## 2025-08-12 RX ORDER — LACOSAMIDE 100 MG/1
TABLET ORAL
Qty: 60 TABLET | Refills: 3 | Status: SHIPPED | OUTPATIENT
Start: 2025-08-12

## 2025-08-12 RX ORDER — RISPERIDONE 1 MG/1
1 TABLET ORAL 2 TIMES DAILY
Qty: 60 TABLET | Refills: 11 | Status: SHIPPED | OUTPATIENT
Start: 2025-08-12

## 2025-08-12 RX ORDER — ESOMEPRAZOLE MAGNESIUM 40 MG/1
CAPSULE, DELAYED RELEASE ORAL
Qty: 30 CAPSULE | Refills: 5 | Status: SHIPPED | OUTPATIENT
Start: 2025-08-12

## 2025-08-12 RX ORDER — DIVALPROEX SODIUM 250 MG/1
250 TABLET, FILM COATED, EXTENDED RELEASE ORAL
Qty: 30 TABLET | Refills: 11 | Status: SHIPPED | OUTPATIENT
Start: 2025-08-12

## (undated) DEVICE — DRAPE TOP 53X102IN

## (undated) DEVICE — SUT VICRYL PLUS 4-0 FS-2 27IN

## (undated) DEVICE — DRAPE UTILITY W/ TAPE 20X30IN

## (undated) DEVICE — ADHESIVE MASTISOL VIAL 48/BX

## (undated) DEVICE — ELECTRODE PATIENT RETURN DISP

## (undated) DEVICE — DRAPE MEDIUM SHEET 40X70IN

## (undated) DEVICE — SUPPORT ULNA NERVE PROTECTOR

## (undated) DEVICE — GLOVE SIGNATURE MICRO LTX 7.5

## (undated) DEVICE — SUT VICRYL 3-0 27 SH

## (undated) DEVICE — NDL SYR 10ML 18X1.5 LL BLUNT

## (undated) DEVICE — STRIP MEDI WND CLSR 1/2X4IN

## (undated) DEVICE — GLOVE SIGNATURE MICRO LTX 6.5

## (undated) DEVICE — SPONGE GAUZE 16PLY 4X4

## (undated) DEVICE — BLADE SURG STAINLESS STEEL #15

## (undated) DEVICE — Device

## (undated) DEVICE — SOL NACL IRR 1000ML BTL